# Patient Record
Sex: FEMALE | Race: WHITE | Employment: FULL TIME | URBAN - METROPOLITAN AREA
[De-identification: names, ages, dates, MRNs, and addresses within clinical notes are randomized per-mention and may not be internally consistent; named-entity substitution may affect disease eponyms.]

---

## 2017-08-21 ENCOUNTER — HOSPITAL ENCOUNTER (OUTPATIENT)
Dept: PHYSICAL THERAPY | Age: 37
Discharge: HOME | End: 2017-08-21
Payer: COMMERCIAL

## 2017-08-21 PROCEDURE — 97162 PT EVAL MOD COMPLEX 30 MIN: CPT

## 2017-08-21 PROCEDURE — 97110 THERAPEUTIC EXERCISES: CPT

## 2017-08-21 NOTE — PROGRESS NOTES
PELVIC FLOOR DAILY TREATMENT NOTE 8-14    Patient Name: Florina Lua  Date:2017  : 1980  [x]  Patient  Verified  Payor: [employer] / Plan: Kimani Warrentorie / Product Type: HMO /    In time:2:30  Out time:3:39  Total Treatment Time (min): 71    Visit #: 1 of 8    Treatment Area: Pelvic and perineal pain [R10.2]    SUBJECTIVE  Pain Level (0-10 scale): 3  Any medication changes, allergies to medications, adverse drug reactions, diagnosis change, or new procedure performed?: [x] No    [] Yes (see summary sheet for update)  Subjective functional status/changes:   [] No changes reported  See medical history    OBJECTIVE            23 min Patient Education: [x] Review HEP    [] Progressed/Changed HEP based on: Educated Pt in pelvic floor anatomy, function/dysfunction and correct execution of a pelvic floor contraction. Reviewed biofeedback results. Extensive education in rectocele including with mirror. Discussed with patient that it may take 4-6 weeks of pelvic floor strengthening to see whether toileting can improve. [] positioning   [] body mechanics   [] transfers   [] heat/ice application          Pain Level (0-10 scale) post treatment: 3    ASSESSMENT/Changes in Function: Justification for Eval Code Complexity:  Patient History : chronic, G3,P3   Examination see exam   Clinical Presentation: evolving  Clinical Decision Making : FOTO : 100 /100. PFDI prolapse      Patient will continue to benefit from skilled PT services to modify and progress therapeutic interventions, address functional mobility deficits, address strength deficits, analyze and address soft tissue restrictions, analyze and modify body mechanics/ergonomics, assess and modify postural abnormalities, instruct in home and community integration and address rectocele, UI and dyspareunia to attain remaining goals.      [x]  See Plan of Care  []  See progress note/recertification  []  See Discharge Summary         Progress towards goals / Updated goals:  Initial evaluation completed with home exercise program and education initiated.       PLAN  [x]  Upgrade activities as tolerated     []  Continue plan of care  []  Update interventions per flow sheet       []  Discharge due to:_  []  Other:_      Sherial Schirmer, PT 8/21/2017  2:28 PM      Future Appointments  Date Time Provider Zac Dunlap   8/21/2017 2:30 PM Sherial Schirmer, PT [facility]

## 2017-08-21 NOTE — PROGRESS NOTES
[address] THERAPY AT [facility], [address], [address] - Phone: [phone]  Fax: [phone] / [address] [facility]  Patient Name: Silvestre Cooper : 1980   Medical   Diagnosis: Pelvic and perineal pain [R10.2] Treatment Diagnosis: Pelvic and perineal pain [R10.2]   Onset Date:      Referral Source: Joanna Hobson MD [facility]): 2017   Prior Hospitalization: See medical history Provider #: [id number]   Prior Level of Function: Chronic symptoms   Comorbidities: G3, P3, Endometriosis   Medications: Verified on Patient Summary List   The Plan of Care and following information is based on the information from the initial evaluation.   ==================================================================================  Assessment / key information:  Patient is a 42 y.o.  female who presents to [facility] with diagnosis of Pelvic and perineal pain [R10.2]. Patient reports vaginal, sacral, lower abdominal and bilateral pelvic/groin pain, UI, dyspareunia and difficulty with bowel movements. She consistently has to apply pressure over the labia and strain to have a bowel movement. Patient is G3, P3 with 2 vaginal deliveries and 1  section. Symptoms began after  section in   Upon objective evaluation, patient demonstrates sacral and SI hypomobility. There was tenderness to palpation over levator ani and obturator internus muscles bilaterally. Strength of pelvic floor muscles was impaired at 2/5. On biofeedback patient presented with  low net rise of fast twitch contraction at 9.5 microvolts and low net rise of slow twitch contraction at 6.34 microvolts. There is a grade 2 rectocele present. Fabere special test was + bilaterally. Patient scored 100 on FOTO/PFDI prolapse indicating decreased quality of life.   Patient can benefit from PT including retraining of muscle control on biofeedback to decrease pain and incontinence, Increase SI mobility and pelvic floor muscle strength to improve pelvic support, ability to engage in sex, ease of bowel movements and quality of life. There was extensive education and discussion about her rectocele. Patient was advised that it may take up to 4-6 weeks to know whether pelvic floor strengthening can begin to improve ease of bowel movements.  ==================================================================================  Eval Complexity: History: HIGH Complexity :3+ comorbidities / personal factors will impact the outcome/ POC Exam:HIGH Complexity : 4+ Standardized tests and measures addressing body structure, function, activity limitation and / or participation in recreation  Presentation: MEDIUM Complexity : Evolving with changing characteristics  Clinical Decision Making:HIGH Complexity : FOTO score of 1- 25 Overall Complexity:MEDIUM  Problem List: Pelvic pain/dysfunction, Decreased pelvic floor mm awareness, Decreased pelvic floor mm strength and Other   Treatment Plan may include any combination of the following: Therapeutic exercise, Neuromuscular re-education, Manual therapy, Physical agent/modality and Patient education  Patient / Family readiness to learn indicated by: asking questions, trying to perform skills and interest  Persons(s) to be included in education: patient (P)  Barriers to Learning/Limitations: None  Measures taken:    Patient Goal   \"Repair issue, no pain\"   Patient self reported health status: good  Rehabilitation Potential: good  Short Term Goals: To be accomplished in 4 weeks:     1) Patient performing pelvic floor exercises TID. 2) Patient will report 20% improvement in ease of bowel movements. 3) Patient will score +2 on GROC regarding pain with ADLs    4) Increase net rise of slow twitch contraction to 8 microvolts to increase pelvic support. Long Term Goals:  To be accomplished in 8 weeks:   1) Patient independent in HEP. 2) Patient will score +4 on GROC regarding pain with ADLs. 3) Decrease score on FOTO/PFDI prolapse to 80 to indicate improved quality of life. 4) Increase net rise of slow twitch contraction to 10 microvolts to improve ease of bowel movements. Frequency / Duration:   Patient to be seen  1  times per week for 8  weeks:  Patient / Caregiver education and instruction: Pain Management, Exercises and Other toileting techniques  G-Codes (GP): ilsa  Therapist Signature: Shaun Bojorquez PT Date: 9/10/1657   Certification Period: na Time: 7:02 PM   ==================================================================================  I certify that the above Physical Therapy Services are being furnished while the patient is under my care. I agree with the treatment plan and certify that this therapy is necessary. Physician Signature:        Date:       Time:     Please sign and [facility] or you may fax the signed copy to [phone]. Thank you.

## 2017-08-28 RX ORDER — SODIUM CHLORIDE 0.9 % (FLUSH) 0.9 %
5-10 SYRINGE (ML) INJECTION EVERY 8 HOURS
Status: CANCELLED | OUTPATIENT
Start: 2017-08-28

## 2017-08-28 RX ORDER — SODIUM CHLORIDE 0.9 % (FLUSH) 0.9 %
5-10 SYRINGE (ML) INJECTION AS NEEDED
Status: CANCELLED | OUTPATIENT
Start: 2017-08-28

## 2017-08-28 RX ORDER — SODIUM CHLORIDE, SODIUM LACTATE, POTASSIUM CHLORIDE, CALCIUM CHLORIDE 600; 310; 30; 20 MG/100ML; MG/100ML; MG/100ML; MG/100ML
150 INJECTION, SOLUTION INTRAVENOUS CONTINUOUS
Status: CANCELLED | OUTPATIENT
Start: 2017-09-05 | End: 2017-09-06

## 2017-08-29 ENCOUNTER — HOSPITAL ENCOUNTER (OUTPATIENT)
Dept: PREADMISSION TESTING | Age: 37
Discharge: HOME | End: 2017-08-29
Payer: COMMERCIAL

## 2017-08-29 DIAGNOSIS — N81.6 RECTOCELE: ICD-10-CM

## 2017-08-29 DIAGNOSIS — R10.2 ADNEXAL TENDERNESS, RIGHT: ICD-10-CM

## 2017-08-29 LAB
ABO + RH BLD: NORMAL
BLOOD GROUP ANTIBODIES SERPL: NORMAL
ERYTHROCYTE [DISTWIDTH] IN BLOOD BY AUTOMATED COUNT: 13.2 % (ref 11.6–14.5)
HCG SERPL-ACNC: <1 MIU/ML (ref 0–10)
HCT VFR BLD AUTO: 40.9 % (ref 35–45)
HGB BLD-MCNC: 13.6 G/DL (ref 12–16)
MCH RBC QN AUTO: 29.6 PG (ref 24–34)
MCHC RBC AUTO-ENTMCNC: 33.3 G/DL (ref 31–37)
MCV RBC AUTO: 88.9 FL (ref 74–97)
PLATELET # BLD AUTO: 314 K/UL (ref 135–420)
PMV BLD AUTO: 10.2 FL (ref 9.2–11.8)
RBC # BLD AUTO: 4.6 M/UL (ref 4.2–5.3)
SPECIMEN EXP DATE BLD: NORMAL
WBC # BLD AUTO: 8.5 K/UL (ref 4.6–13.2)

## 2017-08-29 PROCEDURE — 85027 COMPLETE CBC AUTOMATED: CPT | Performed by: OBSTETRICS & GYNECOLOGY

## 2017-08-29 PROCEDURE — 86900 BLOOD TYPING SEROLOGIC ABO: CPT | Performed by: OBSTETRICS & GYNECOLOGY

## 2017-08-29 PROCEDURE — 84702 CHORIONIC GONADOTROPIN TEST: CPT | Performed by: OBSTETRICS & GYNECOLOGY

## 2017-08-29 PROCEDURE — 36415 COLL VENOUS BLD VENIPUNCTURE: CPT | Performed by: OBSTETRICS & GYNECOLOGY

## 2017-08-29 NOTE — PERIOP NOTES
PAT - SURGICAL PRE-ADMISSION INSTRUCTIONS    NAME:  Alanis Puckett                                                          TODAY'S DATE:  8/29/2017    SURGERY DATE:  9/5/2017                                  SURGERY ARRIVAL TIME:  0800    1. Do NOT eat or drink anything, including candy or gum, after MIDNIGHT on [id number] , unless you have specific instructions from your Surgeon or Anesthesia Provider to do so. 2. No smoking on the day of surgery. 3. No alcohol 24 hours prior to the day of surgery. 4. No recreational drugs for one week prior to the day of surgery. 5. Leave all valuables, including money/purse, at home. 6. Remove all jewelry, nail polish, makeup (including mascara); no lotions, powders, deodorant, or perfume/cologne/after shave. 7. Glasses/Contact lenses and Dentures may be worn to the hospital.  They will be removed prior to surgery. 8. Call your doctor if symptoms of a cold or illness develop within 24 ours prior to surgery. 9. AN ADULT MUST DRIVE YOU HOME AFTER OUTPATIENT SURGERY. 10. If you are having an OUTPATIENT procedure, please make arrangements for a responsible adult to be with you for 24 hours after your surgery. 11. If you are admitted to the hospital, you will be assigned to a bed after surgery is complete. Normally a family member will not be able to see you until you are in your assigned bed. 15. Family is encouraged to accompany you to the hospital.  We ask visitors in the treatment area to be limited to ONE person at a time to ensure patient privacy. EXCEPTIONS WILL BE MADE AS NEEDED. 15. Children under 12 are discouraged from entering the treatment area and need to be supervised by an adult when in the waiting room. Special Instructions:     Take these medications the morning of surgery with a sip of water:  BLOOD PRESSURE/ HEART MEDS, Complete bowel prep per MD instructions., STOP anticoagulants AT LEAST 1 WEEK PRIOR to your surgery or, follow other MD instructions:  NSAIDS, BLOOD THINNERS, ASPIRIN    Patient Prep:    use CHG solution    These surgical instructions were reviewed with PATIENT during the PAT VISIT . A printed copy of the instructions was provided to PATIENT. Directions: On the morning of surgery, please go to the [facility]. Enter the building from the [facility] entrance, 1st floor (next to the Emergency Room entrance). Take the elevator to the 2nd floor. Sign in at the Registration Desk.     If you have any questions and/or concerns, please do not hesitate to call:  (Prior to the day of surgery)  [facility] unit:  [phone]  (Day of surgery)  [facility] unit:  [phone]

## 2017-09-01 ENCOUNTER — ANESTHESIA EVENT (OUTPATIENT)
Dept: SURGERY | Age: 37
End: 2017-09-01
Payer: COMMERCIAL

## 2017-09-01 NOTE — H&P
[address]  PRE-OP H AND PS    Name:  Judy Romero  MR#:  [id number]  :  1980  Account #:  [de-identified]  Date of Adm:  2017      PLAN: Robotic-assisted total laparoscopic hysterectomy, removal of  uterus, cervix, tubes, probably leaving the ovaries behind. DIAGNOSES  1. Dysmenorrhea. 2. Pain with bowel movements, suspect endometriosis. 3. She also has a rectocele, which is going to be not cared for at this  time. Future repair or physical therapy is planned for that. PAST HISTORY: Three vaginal deliveries, 1  section. Otherwise, she does not have any high blood pressure, heart disease,  diabetes, medical or surgical problems. FAMILY HISTORY: Paternal grandmother with breast cancer,  otherwise negative. SOCIAL HISTORY: Denies smoking or heavy alcohol use. ALLERGIES: IODINE CAUSES ITCHING. REVIEW OF SYSTEMS: Normal other than the above. PHYSICAL EXAMINATION  GENERAL: Oriented x3. VITAL SIGNS: Weight 229, blood pressure 138/88. HEENT: Normal.  THYROID: Normal.  CHEST: Clear to auscultation and percussion. BREASTS: Without masses. ABDOMEN: Benign. PELVIC: Benign. Pap is repeated and up to date recently. Ultrasound  does not reveal any particular masses. IMPRESSION  1. Rectocele, not to be repaired at this time. 2. Severe dysmenorrhea, unremitting with observation. Declines birth  control cycling. 3. Suspected endometriosis. PLAN: Robotic-assisted total laparoscopic hysterectomy, removal of  uterus, cervix, tubes, but probably not the ovaries unless  endometriosis is severe.         Claudette Stanton MD    [address] [facility] /   D:  2017   10:20  T:  2017   10:49  Job #:  [id number]

## 2017-09-05 ENCOUNTER — HOSPITAL ENCOUNTER (OUTPATIENT)
Age: 37
Discharge: HOME | End: 2017-09-06
Attending: OBSTETRICS & GYNECOLOGY | Admitting: OBSTETRICS & GYNECOLOGY
Payer: COMMERCIAL

## 2017-09-05 ENCOUNTER — ANESTHESIA (OUTPATIENT)
Dept: SURGERY | Age: 37
End: 2017-09-05
Payer: COMMERCIAL

## 2017-09-05 PROBLEM — Z90.710 S/P HYSTERECTOMY: Status: ACTIVE | Noted: 2017-09-05

## 2017-09-05 LAB — HCG UR QL: NEGATIVE

## 2017-09-05 PROCEDURE — 77030022704 HC SUT VLOC COVD -B: Performed by: OBSTETRICS & GYNECOLOGY

## 2017-09-05 PROCEDURE — 74011250637 HC RX REV CODE- 250/637

## 2017-09-05 PROCEDURE — 76060000035 HC ANESTHESIA 2 TO 2.5 HR: Performed by: OBSTETRICS & GYNECOLOGY

## 2017-09-05 PROCEDURE — 77030029357 HC DEV CLSR FAC SYS EFX TELE -C: Performed by: OBSTETRICS & GYNECOLOGY

## 2017-09-05 PROCEDURE — C1727 CATH, BAL TIS DIS, NON-VAS: HCPCS | Performed by: OBSTETRICS & GYNECOLOGY

## 2017-09-05 PROCEDURE — 77030008771 HC TU NG SALEM SUMP -A: Performed by: NURSE ANESTHETIST, CERTIFIED REGISTERED

## 2017-09-05 PROCEDURE — 74011250636 HC RX REV CODE- 250/636

## 2017-09-05 PROCEDURE — 77030002933 HC SUT MCRYL J&J -A: Performed by: OBSTETRICS & GYNECOLOGY

## 2017-09-05 PROCEDURE — 77030032490 HC SLV COMPR SCD KNE COVD -B: Performed by: OBSTETRICS & GYNECOLOGY

## 2017-09-05 PROCEDURE — 77030003543 HC NDL EPDRL BBMI -A: Performed by: OBSTETRICS & GYNECOLOGY

## 2017-09-05 PROCEDURE — 77030008606 HC TRCR ENDOSC KII AMR -B: Performed by: OBSTETRICS & GYNECOLOGY

## 2017-09-05 PROCEDURE — 77030008518 HC TBNG INSUF ENDO STRY -B: Performed by: OBSTETRICS & GYNECOLOGY

## 2017-09-05 PROCEDURE — 74011250636 HC RX REV CODE- 250/636: Performed by: OBSTETRICS & GYNECOLOGY

## 2017-09-05 PROCEDURE — 74011250636 HC RX REV CODE- 250/636: Performed by: NURSE ANESTHETIST, CERTIFIED REGISTERED

## 2017-09-05 PROCEDURE — 74011000250 HC RX REV CODE- 250: Performed by: OBSTETRICS & GYNECOLOGY

## 2017-09-05 PROCEDURE — 77030034850: Performed by: OBSTETRICS & GYNECOLOGY

## 2017-09-05 PROCEDURE — 81025 URINE PREGNANCY TEST: CPT

## 2017-09-05 PROCEDURE — 77030018778 HC MANIP UTER VCAR CNMD -B: Performed by: OBSTETRICS & GYNECOLOGY

## 2017-09-05 PROCEDURE — 76210000006 HC OR PH I REC 0.5 TO 1 HR: Performed by: OBSTETRICS & GYNECOLOGY

## 2017-09-05 PROCEDURE — 88307 TISSUE EXAM BY PATHOLOGIST: CPT | Performed by: OBSTETRICS & GYNECOLOGY

## 2017-09-05 PROCEDURE — 77030008683 HC TU ET CUF COVD -A: Performed by: NURSE ANESTHETIST, CERTIFIED REGISTERED

## 2017-09-05 PROCEDURE — 77030013079 HC BLNKT BAIR HGGR 3M -A: Performed by: NURSE ANESTHETIST, CERTIFIED REGISTERED

## 2017-09-05 PROCEDURE — 77030020255 HC SOL INJ LR 1000ML BG

## 2017-09-05 PROCEDURE — 77030020703 HC SEAL CANN DISP INTU -B: Performed by: OBSTETRICS & GYNECOLOGY

## 2017-09-05 PROCEDURE — 77030010507 HC ADH SKN DERMBND J&J -B: Performed by: OBSTETRICS & GYNECOLOGY

## 2017-09-05 PROCEDURE — 77030035048 HC TRCR ENDOSC OPTCL COVD -B: Performed by: OBSTETRICS & GYNECOLOGY

## 2017-09-05 PROCEDURE — 77030018842 HC SOL IRR SOD CL 9% BAXT -A: Performed by: OBSTETRICS & GYNECOLOGY

## 2017-09-05 PROCEDURE — 51798 US URINE CAPACITY MEASURE: CPT

## 2017-09-05 PROCEDURE — 74011250637 HC RX REV CODE- 250/637: Performed by: OBSTETRICS & GYNECOLOGY

## 2017-09-05 PROCEDURE — 77030033067 HC SUT PDO STRATFX SPIR J&J -B: Performed by: OBSTETRICS & GYNECOLOGY

## 2017-09-05 PROCEDURE — 76010000876 HC OR TIME 2 TO 2.5HR INTENSV - TIER 2: Performed by: OBSTETRICS & GYNECOLOGY

## 2017-09-05 PROCEDURE — 77030020254 HC SOL INJ D5LR LACTATED RINGER

## 2017-09-05 PROCEDURE — 77030008477 HC STYL SATN SLP COVD -A: Performed by: NURSE ANESTHETIST, CERTIFIED REGISTERED

## 2017-09-05 PROCEDURE — 77030035277 HC OBTRTR BLDELSS DISP INTU -B: Performed by: OBSTETRICS & GYNECOLOGY

## 2017-09-05 PROCEDURE — 77030011640 HC PAD GRND REM COVD -A: Performed by: OBSTETRICS & GYNECOLOGY

## 2017-09-05 PROCEDURE — 77030002986 HC SUT PROL J&J -A: Performed by: OBSTETRICS & GYNECOLOGY

## 2017-09-05 PROCEDURE — 77030016151 HC PROTCTR LNS DFOG COVD -B: Performed by: OBSTETRICS & GYNECOLOGY

## 2017-09-05 PROCEDURE — 74011000250 HC RX REV CODE- 250

## 2017-09-05 RX ORDER — SODIUM CHLORIDE 0.9 % (FLUSH) 0.9 %
5-10 SYRINGE (ML) INJECTION EVERY 8 HOURS
Status: DISCONTINUED | OUTPATIENT
Start: 2017-09-05 | End: 2017-09-06 | Stop reason: HOSPADM

## 2017-09-05 RX ORDER — VECURONIUM BROMIDE FOR INJECTION 1 MG/ML
INJECTION, POWDER, LYOPHILIZED, FOR SOLUTION INTRAVENOUS AS NEEDED
Status: DISCONTINUED | OUTPATIENT
Start: 2017-09-05 | End: 2017-09-05 | Stop reason: HOSPADM

## 2017-09-05 RX ORDER — SODIUM CHLORIDE, SODIUM LACTATE, POTASSIUM CHLORIDE, CALCIUM CHLORIDE 600; 310; 30; 20 MG/100ML; MG/100ML; MG/100ML; MG/100ML
50 INJECTION, SOLUTION INTRAVENOUS CONTINUOUS
Status: DISCONTINUED | OUTPATIENT
Start: 2017-09-06 | End: 2017-09-05 | Stop reason: HOSPADM

## 2017-09-05 RX ORDER — ONDANSETRON 4 MG/1
4 TABLET, ORALLY DISINTEGRATING ORAL
Status: DISCONTINUED | OUTPATIENT
Start: 2017-09-05 | End: 2017-09-06 | Stop reason: HOSPADM

## 2017-09-05 RX ORDER — KETOROLAC TROMETHAMINE 30 MG/ML
INJECTION, SOLUTION INTRAMUSCULAR; INTRAVENOUS AS NEEDED
Status: DISCONTINUED | OUTPATIENT
Start: 2017-09-05 | End: 2017-09-05 | Stop reason: HOSPADM

## 2017-09-05 RX ORDER — BUPIVACAINE HYDROCHLORIDE AND EPINEPHRINE 5; 5 MG/ML; UG/ML
INJECTION, SOLUTION EPIDURAL; INTRACAUDAL; PERINEURAL AS NEEDED
Status: DISCONTINUED | OUTPATIENT
Start: 2017-09-05 | End: 2017-09-05 | Stop reason: HOSPADM

## 2017-09-05 RX ORDER — DIPHENHYDRAMINE HCL 25 MG
25 CAPSULE ORAL
Status: DISCONTINUED | OUTPATIENT
Start: 2017-09-05 | End: 2017-09-06 | Stop reason: HOSPADM

## 2017-09-05 RX ORDER — ACETAMINOPHEN 325 MG/1
650 TABLET ORAL
Status: DISCONTINUED | OUTPATIENT
Start: 2017-09-05 | End: 2017-09-06 | Stop reason: HOSPADM

## 2017-09-05 RX ORDER — ONDANSETRON 2 MG/ML
INJECTION INTRAMUSCULAR; INTRAVENOUS AS NEEDED
Status: DISCONTINUED | OUTPATIENT
Start: 2017-09-05 | End: 2017-09-05 | Stop reason: HOSPADM

## 2017-09-05 RX ORDER — PROPOFOL 10 MG/ML
INJECTION, EMULSION INTRAVENOUS AS NEEDED
Status: DISCONTINUED | OUTPATIENT
Start: 2017-09-05 | End: 2017-09-05 | Stop reason: HOSPADM

## 2017-09-05 RX ORDER — DOCUSATE SODIUM 100 MG/1
100 CAPSULE, LIQUID FILLED ORAL 2 TIMES DAILY
Status: DISCONTINUED | OUTPATIENT
Start: 2017-09-05 | End: 2017-09-06 | Stop reason: HOSPADM

## 2017-09-05 RX ORDER — FENTANYL CITRATE 50 UG/ML
50 INJECTION, SOLUTION INTRAMUSCULAR; INTRAVENOUS AS NEEDED
Status: DISCONTINUED | OUTPATIENT
Start: 2017-09-05 | End: 2017-09-05 | Stop reason: HOSPADM

## 2017-09-05 RX ORDER — NEOSTIGMINE METHYLSULFATE 5 MG/5 ML
SYRINGE (ML) INTRAVENOUS AS NEEDED
Status: DISCONTINUED | OUTPATIENT
Start: 2017-09-05 | End: 2017-09-05 | Stop reason: HOSPADM

## 2017-09-05 RX ORDER — SODIUM CHLORIDE, SODIUM LACTATE, POTASSIUM CHLORIDE, CALCIUM CHLORIDE 600; 310; 30; 20 MG/100ML; MG/100ML; MG/100ML; MG/100ML
50 INJECTION, SOLUTION INTRAVENOUS CONTINUOUS
Status: DISCONTINUED | OUTPATIENT
Start: 2017-09-05 | End: 2017-09-05 | Stop reason: HOSPADM

## 2017-09-05 RX ORDER — GLYCOPYRROLATE 0.2 MG/ML
INJECTION INTRAMUSCULAR; INTRAVENOUS AS NEEDED
Status: DISCONTINUED | OUTPATIENT
Start: 2017-09-05 | End: 2017-09-05 | Stop reason: HOSPADM

## 2017-09-05 RX ORDER — FENTANYL CITRATE 50 UG/ML
INJECTION, SOLUTION INTRAMUSCULAR; INTRAVENOUS AS NEEDED
Status: DISCONTINUED | OUTPATIENT
Start: 2017-09-05 | End: 2017-09-05 | Stop reason: HOSPADM

## 2017-09-05 RX ORDER — HYDROMORPHONE HYDROCHLORIDE 2 MG/ML
0.5 INJECTION, SOLUTION INTRAMUSCULAR; INTRAVENOUS; SUBCUTANEOUS
Status: DISCONTINUED | OUTPATIENT
Start: 2017-09-05 | End: 2017-09-05 | Stop reason: HOSPADM

## 2017-09-05 RX ORDER — MIDAZOLAM HYDROCHLORIDE 1 MG/ML
INJECTION, SOLUTION INTRAMUSCULAR; INTRAVENOUS AS NEEDED
Status: DISCONTINUED | OUTPATIENT
Start: 2017-09-05 | End: 2017-09-05 | Stop reason: HOSPADM

## 2017-09-05 RX ORDER — DEXAMETHASONE SODIUM PHOSPHATE 4 MG/ML
INJECTION, SOLUTION INTRA-ARTICULAR; INTRALESIONAL; INTRAMUSCULAR; INTRAVENOUS; SOFT TISSUE AS NEEDED
Status: DISCONTINUED | OUTPATIENT
Start: 2017-09-05 | End: 2017-09-05 | Stop reason: HOSPADM

## 2017-09-05 RX ORDER — HYDROMORPHONE HYDROCHLORIDE 2 MG/ML
INJECTION, SOLUTION INTRAMUSCULAR; INTRAVENOUS; SUBCUTANEOUS AS NEEDED
Status: DISCONTINUED | OUTPATIENT
Start: 2017-09-05 | End: 2017-09-05 | Stop reason: HOSPADM

## 2017-09-05 RX ORDER — SODIUM CHLORIDE 0.9 % (FLUSH) 0.9 %
5-10 SYRINGE (ML) INJECTION AS NEEDED
Status: DISCONTINUED | OUTPATIENT
Start: 2017-09-05 | End: 2017-09-06 | Stop reason: HOSPADM

## 2017-09-05 RX ORDER — FAMOTIDINE 20 MG/1
20 TABLET, FILM COATED ORAL ONCE
Status: DISCONTINUED | OUTPATIENT
Start: 2017-09-06 | End: 2017-09-05 | Stop reason: HOSPADM

## 2017-09-05 RX ORDER — SODIUM CHLORIDE 0.9 % (FLUSH) 0.9 %
5-10 SYRINGE (ML) INJECTION AS NEEDED
Status: DISCONTINUED | OUTPATIENT
Start: 2017-09-05 | End: 2017-09-05 | Stop reason: HOSPADM

## 2017-09-05 RX ORDER — CEFAZOLIN SODIUM 2 G/50ML
2 SOLUTION INTRAVENOUS
Status: COMPLETED | OUTPATIENT
Start: 2017-09-05 | End: 2017-09-05

## 2017-09-05 RX ORDER — HYDROMORPHONE HYDROCHLORIDE 1 MG/ML
1 INJECTION, SOLUTION INTRAMUSCULAR; INTRAVENOUS; SUBCUTANEOUS
Status: DISCONTINUED | OUTPATIENT
Start: 2017-09-05 | End: 2017-09-06 | Stop reason: HOSPADM

## 2017-09-05 RX ORDER — SUCCINYLCHOLINE CHLORIDE 20 MG/ML
INJECTION INTRAMUSCULAR; INTRAVENOUS AS NEEDED
Status: DISCONTINUED | OUTPATIENT
Start: 2017-09-05 | End: 2017-09-05 | Stop reason: HOSPADM

## 2017-09-05 RX ORDER — OXYCODONE AND ACETAMINOPHEN 5; 325 MG/1; MG/1
1 TABLET ORAL
Status: DISCONTINUED | OUTPATIENT
Start: 2017-09-05 | End: 2017-09-06 | Stop reason: HOSPADM

## 2017-09-05 RX ORDER — SODIUM CHLORIDE 0.9 % (FLUSH) 0.9 %
5-10 SYRINGE (ML) INJECTION EVERY 8 HOURS
Status: DISCONTINUED | OUTPATIENT
Start: 2017-09-05 | End: 2017-09-05 | Stop reason: HOSPADM

## 2017-09-05 RX ORDER — LIDOCAINE HYDROCHLORIDE 20 MG/ML
INJECTION, SOLUTION EPIDURAL; INFILTRATION; INTRACAUDAL; PERINEURAL AS NEEDED
Status: DISCONTINUED | OUTPATIENT
Start: 2017-09-05 | End: 2017-09-05 | Stop reason: HOSPADM

## 2017-09-05 RX ORDER — FAMOTIDINE 20 MG/1
TABLET, FILM COATED ORAL
Status: COMPLETED
Start: 2017-09-05 | End: 2017-09-05

## 2017-09-05 RX ADMIN — VECURONIUM BROMIDE FOR INJECTION 2 MG: 1 INJECTION, POWDER, LYOPHILIZED, FOR SOLUTION INTRAVENOUS at 11:33

## 2017-09-05 RX ADMIN — GLYCOPYRROLATE 0.4 MG: 0.2 INJECTION INTRAMUSCULAR; INTRAVENOUS at 12:15

## 2017-09-05 RX ADMIN — ONDANSETRON 4 MG: 2 INJECTION INTRAMUSCULAR; INTRAVENOUS at 10:17

## 2017-09-05 RX ADMIN — FAMOTIDINE 20 MG: 20 TABLET ORAL at 09:09

## 2017-09-05 RX ADMIN — Medication 3 MG: at 12:15

## 2017-09-05 RX ADMIN — CEFAZOLIN SODIUM 2 G: 2 SOLUTION INTRAVENOUS at 10:31

## 2017-09-05 RX ADMIN — LIDOCAINE HYDROCHLORIDE 40 MG: 20 INJECTION, SOLUTION EPIDURAL; INFILTRATION; INTRACAUDAL; PERINEURAL at 10:19

## 2017-09-05 RX ADMIN — VECURONIUM BROMIDE FOR INJECTION 5 MG: 1 INJECTION, POWDER, LYOPHILIZED, FOR SOLUTION INTRAVENOUS at 10:26

## 2017-09-05 RX ADMIN — PROPOFOL 180 MG: 10 INJECTION, EMULSION INTRAVENOUS at 10:19

## 2017-09-05 RX ADMIN — KETOROLAC TROMETHAMINE 30 MG: 30 INJECTION, SOLUTION INTRAMUSCULAR; INTRAVENOUS at 12:17

## 2017-09-05 RX ADMIN — VECURONIUM BROMIDE FOR INJECTION 2 MG: 1 INJECTION, POWDER, LYOPHILIZED, FOR SOLUTION INTRAVENOUS at 10:58

## 2017-09-05 RX ADMIN — SODIUM CHLORIDE, SODIUM LACTATE, POTASSIUM CHLORIDE, AND CALCIUM CHLORIDE 50 ML/HR: 600; 310; 30; 20 INJECTION, SOLUTION INTRAVENOUS at 09:03

## 2017-09-05 RX ADMIN — HYDROMORPHONE HYDROCHLORIDE 1 MG: 1 INJECTION, SOLUTION INTRAMUSCULAR; INTRAVENOUS; SUBCUTANEOUS at 22:59

## 2017-09-05 RX ADMIN — SUCCINYLCHOLINE CHLORIDE 100 MG: 20 INJECTION INTRAMUSCULAR; INTRAVENOUS at 10:19

## 2017-09-05 RX ADMIN — OXYCODONE HYDROCHLORIDE AND ACETAMINOPHEN 1 TABLET: 5; 325 TABLET ORAL at 14:06

## 2017-09-05 RX ADMIN — OXYCODONE HYDROCHLORIDE AND ACETAMINOPHEN 1 TABLET: 5; 325 TABLET ORAL at 19:08

## 2017-09-05 RX ADMIN — HYDROMORPHONE HYDROCHLORIDE 1 MG: 2 INJECTION, SOLUTION INTRAMUSCULAR; INTRAVENOUS; SUBCUTANEOUS at 10:12

## 2017-09-05 RX ADMIN — HYDROMORPHONE HYDROCHLORIDE 0.5 MG: 2 INJECTION INTRAMUSCULAR; INTRAVENOUS; SUBCUTANEOUS at 13:33

## 2017-09-05 RX ADMIN — MIDAZOLAM HYDROCHLORIDE 2 MG: 1 INJECTION, SOLUTION INTRAMUSCULAR; INTRAVENOUS at 10:09

## 2017-09-05 RX ADMIN — DEXAMETHASONE SODIUM PHOSPHATE 4 MG: 4 INJECTION, SOLUTION INTRA-ARTICULAR; INTRALESIONAL; INTRAMUSCULAR; INTRAVENOUS; SOFT TISSUE at 10:17

## 2017-09-05 RX ADMIN — HYDROMORPHONE HYDROCHLORIDE 1 MG: 2 INJECTION, SOLUTION INTRAMUSCULAR; INTRAVENOUS; SUBCUTANEOUS at 10:18

## 2017-09-05 RX ADMIN — DOCUSATE SODIUM 100 MG: 100 CAPSULE, LIQUID FILLED ORAL at 19:08

## 2017-09-05 RX ADMIN — FENTANYL CITRATE 100 MCG: 50 INJECTION, SOLUTION INTRAMUSCULAR; INTRAVENOUS at 12:40

## 2017-09-05 RX ADMIN — SODIUM CHLORIDE, SODIUM LACTATE, POTASSIUM CHLORIDE, AND CALCIUM CHLORIDE: 600; 310; 30; 20 INJECTION, SOLUTION INTRAVENOUS at 12:24

## 2017-09-05 RX ADMIN — ONDANSETRON 4 MG: 4 TABLET, ORALLY DISINTEGRATING ORAL at 16:42

## 2017-09-05 NOTE — PROGRESS NOTES
1553 - received pt in room. Pt is up to bathroom. Void x1. Family at bedside. 1612 - assessment completed. AOx4. VSS. Pain rated 7/10, pt refuse additional pain medication at this time. Wishes to wait for scheduled time. 1800 - pt resting in bed, bladder scan performed for 259cc. Pt c/o fullness. Pt will make another attempt. 1850 - pt transferred to another floor, ambulated to room. No distress noted. Upon arrival to room pt attempted another void, able to void scant amount. Pt advised that she does have difficultly and needs to position herself at times. Will continue to monitor.

## 2017-09-05 NOTE — IP AVS SNAPSHOT
[address] [address] [facility] Patient: Suzy Solomon MRN: [id number] :1980 You are allergic to the following No active allergies Recent Documentation Height Weight BMI OB Status Smoking Status 1.727 m 104.3 kg 34.97 kg/m2 Having regular periods Never Smoker Emergency Contacts Name Discharge Info Relation Home Work Mobile Tavares Martin DISCHARGE CAREGIVER [3] Spouse [3] [phone] About your hospitalization You were admitted on:  2017 You last received care in the:  [facility] 2E GEN SURG You were discharged on:  2017 Unit phone number:  [phone] Why you were hospitalized Your primary diagnosis was:  Not on File Your diagnoses also included:  S/P Hysterectomy Providers Seen During Your Hospitalizations Provider Role Specialty Primary office phone Ana María Steel MD Attending Provider Obstetrics & Gynecology [phone] Your Primary Care Physician (PCP) Primary Care Physician Office Phone Office Fax NONE ** None ** ** None ** Follow-up Information Follow up With Details Comments Contact Info None   None ([phone]) Patient stated that they have no PCP 
  
 Ana María Steel MD In 2 weeks for follow up [address] [place] [address] [address] [id number] [phone] Current Discharge Medication List  
  
Notice You have not been prescribed any medications. Discharge Instructions DISCHARGE SUMMARY from Nurse The following personal items are in your possession at time of discharge: 
 
Dental Appliances: None Visual Aid: None PATIENT INSTRUCTIONS: 
 
 
F-face looks uneven A-arms unable to move or move unevenly S-speech slurred or non-existent T-time-call 911 as soon as signs and symptoms begin-DO NOT go Back to bed or wait to see if you get better-TIME IS BRAIN. Warning Signs of HEART ATTACK Call 911 if you have these symptoms: 
? Chest discomfort. Most heart attacks involve discomfort in the center of the chest that lasts more than a few minutes, or that goes away and comes back. It can feel like uncomfortable pressure, squeezing, fullness, or pain. ? Discomfort in other areas of the upper body. Symptoms can include pain or discomfort in one or both arms, the back, neck, jaw, or stomach. ? Shortness of breath with or without chest discomfort. ? Other signs may include breaking out in a cold sweat, nausea, or lightheadedness. Don't wait more than five minutes to call [address]! Fast action can save your life. Calling 911 is almost always the fastest way to get lifesaving treatment. Emergency Medical Services staff can begin treatment when they arrive  up to an hour sooner than if someone gets to the hospital by car. The discharge information has been reviewed with the patient. The patient verbalized understanding. Discharge medications reviewed with the patient and appropriate educational materials and side effects teaching were provided. Patient armband removed and shredded. Patient Instructions: 
- No heavy lifting for 4 weeks (>20lbs), pelvic rest for 6 weeks (no intercourse, tampons, douching). - No driving for 1 week or while taking Rx pain medication.  
- Begin taking a stool softener (Colace). May take a laxative (Miralax) for constipation. - Contact office for fevers >101F, pain not controlled with oral pain medicine, or heavy vaginal bleeding (soaking 1 pad every 1-2 hours). May expect to see some light vaginal spotting/discharge (brown - red).  
  
Diet: Regular Diet 
  
Wound Care: OK to shower, no tub baths. Skin glue and stitches will dissolve on their own, do not remove.  
  
Follow-up with 2 weeks as previously scheduled. . 
  
  
 
Discharge Instructions Attachments/References LAPAROSCOPIC HYSTERECTOMY: POST-OP (ENGLISH) Discharge Orders Procedure Order Date Status Priority Quantity Spec Type Associated Dx CALL YOUR DOCTOR For: Temperature greater than 100.4., Persistant nausea and vomiting., Severe uncontrolled pain. , Redness, tenderness, or signs of infection. 09/06/17 0605 Normal Routine 1 Questions: For:  Temperature greater than 100.4. For:  Persistant nausea and vomiting. For:  Severe uncontrolled pain. For:  Redness, tenderness, or signs of infection. ACTIVITY AFTER DISCHARGE Patient should: Restrict sexual activity. 09/06/17 0605 Normal Routine 1 Schedule Instructions:  Nothing in the vagina for 6 weeks. No lifting greater than 10 lbs for 1 week Questions: Patient should:  Restrict sexual activity. DIET REGULAR No added salt 09/06/17 0605 Normal Routine 1 Questions: Additional options:  No added salt [facility] [place] Announcement We are excited to announce that we are making your provider's discharge notes available to you in MongoDB. You will see these notes when they are completed and signed by the physician that discharged you from your recent hospital stay. If you have any questions or concerns about any information you see in Fromographyt, please call the Health Information Department where you were seen or reach out to your Primary Care Provider for more information about your plan of care. Introducing [facility] SERVICES! [facility] introduces GEOCOMtms patient portal. Now you can access parts of your medical record, email your doctor's office, and request medication refills online. 1. In your internet browser, go to [email or web address] 2. Click on the First Time User? Click Here link in the Sign In box. You will see the New Member Sign Up page. 3. Enter your GEOCOMtms Access Code exactly as it appears below. You will not need to use this code after youve completed the sign-up process. If you do not sign up before the expiration date, you must request a new code. · GEOCOMtms Access Code: [email or web address] Expires: 12/5/2017  8:56 AM 
 
4. Enter the last four digits of your Social Security Number (xxxx) and Date of Birth (mm/dd/yyyy) as indicated and click Submit. You will be taken to the next sign-up page. 5. Create a GEOCOMtms ID. This will be your GEOCOMtms login ID and cannot be changed, so think of one that is secure and easy to remember. 6. Create a GEOCOMtms password. You can change your password at any time. 7. Enter your Password Reset Question and Answer. This can be used at a later time if you forget your password. 8. Enter your e-mail address. You will receive e-mail notification when new information is available in [address] 9. Click Sign Up. You can now view and download portions of your medical record. 10. Click the Download Summary menu link to download a portable copy of your medical information. If you have questions, please visit the Frequently Asked Questions section of the GEOCOMtms website. Remember, GEOCOMtms is NOT to be used for urgent needs. For medical emergencies, dial 911. Now available from your iPhone and Android! General Information Please provide this summary of care documentation to your next provider. Patient Signature:  ____________________________________________________________ Date:  ____________________________________________________________  
  
Vianey Velázquez Provider Signature:  ____________________________________________________________ Date:  ____________________________________________________________ More Information Laparoscopic Hysterectomy: What to Expect at [facility] Your Recovery A hysterectomy is surgery to take out the uterus. In some cases, the ovaries and fallopian tubes also are taken out at the same time. You can expect to feel better and stronger each day, but you may need pain medicine for a week or two. You may get tired easily or have less energy than usual. The tiredness may last for several weeks after surgery. You will probably notice that your belly is swollen and puffy. This is common. The swelling will take several weeks to go down. You may take about 4 to 6 weeks to fully recover. It is important to avoid lifting while you are recovering so that you can heal. 
This care sheet gives you a general idea about how long it will take for you to recover. But each person recovers at a different pace. Follow the steps below to get better as quickly as possible. How can you care for yourself at home? Activity · Rest when you feel tired. · Be active. Walking is a good choice. · Allow the area to heal. Don't move quickly or lift anything heavy until you are feeling better. · You may shower 24 to 48 hours after surgery, if your doctor okays it. Pat the incision dry. Do not take a bath for the first 2 weeks, or until your doctor tells you it is okay. · Ask your doctor when it is okay for you to have sex. Diet · You can eat your normal diet. If your stomach is upset, try bland, low-fat foods like plain rice, broiled chicken, toast, and yogurt. · If your bowel movements are not regular right after surgery, try to avoid constipation and straining. Drink plenty of water. Your doctor may suggest fiber, a stool softener, or a mild laxative. Medicines · Your doctor will tell you if and when you can restart your medicines. He or she will also give you instructions about taking any new medicines. · If you take blood thinners, such as warfarin (Coumadin), clopidogrel (Plavix), or aspirin, be sure to talk to your doctor. He or she will tell you if and when to start taking those medicines again. Make sure that you understand exactly what your doctor wants you to do. · Be safe with medicines. Read and follow all instructions on the label. ¨ If the doctor gave you a prescription medicine for pain, take it as prescribed. ¨ If you are not taking a prescription pain medicine, ask your doctor if you can take an over-the-counter medicine. · If your doctor prescribed antibiotics, take them as directed. Do not stop taking them just because you feel better. You need to take the full course of antibiotics. Incision care · You may have stitches over the cuts (incisions) the doctor made in your belly. · If you have strips of tape on the cut (incision) the doctor made, leave the tape on for a week or until it falls off. · Wash the area daily with warm, soapy water, and pat it dry. Don't use hydrogen peroxide or alcohol. They can slow healing. · You may cover the area with a gauze bandage if it oozes fluid or rubs against clothing. · Change the bandage every day. Other instructions · You may have some light vaginal bleeding. Wear sanitary pads if needed. Do not douche or use tampons. · Don't have sex until the doctor says it is okay. Follow-up care is a key part of your treatment and safety. Be sure to make and go to all appointments, and call your doctor if you are having problems. It's also a good idea to know your test results and keep a list of the medicines you take. When should you call for help? Call 911 anytime you think you may need emergency care. For example, call if: 
· You passed out (lost consciousness). · You have sudden chest pain and shortness of breath, or you cough up blood. Call your doctor now or seek immediate medical care if: 
· You have severe vaginal bleeding. This means that you are soaking through your usual pads every hour for 2 or more hours. · You have sudden, severe pain in your belly or pelvis. · You have loose stitches, or your incision comes open. · You have signs of infection, such as: 
¨ Increased pain, swelling, warmth, or redness. ¨ Red streaks leading from the incision. ¨ Pus draining from the incision. ¨ Swollen lymph nodes in your neck, armpits, or groin. ¨ A fever. Watch closely for changes in your health, and be sure to contact your doctor if: 
· You do not get better as expected. Where can you learn more? Go to http://monae-torito.info/. Enter Q131 in the search box to learn more about \"Laparoscopic Hysterectomy: What to Expect at Home. \" Current as of: October 13, 2016 Content Version: 11.3 © 5871-0558 [employer]. Care instructions adapted under license by [employer] (which disclaims liability or warranty for this information). If you have questions about a medical condition or this instruction, always ask your healthcare professional. [address] any warranty or liability for your use of this information.

## 2017-09-05 NOTE — PERIOP NOTES
Patient arrived to PACU via stretcher, attached to monitor, VS stable, orders acknowledged, will continue to monitor. 1510: TRANSFER - OUT REPORT:    Verbal report given to LETY Ramirez(name) on Lisa Maya  being transferred to [facility](unit) for routine post - op       Report consisted of patients Situation, Background, Assessment and   Recommendations(SBAR). Information from the following report(s) SBAR, Kardex, OR Summary, Intake/Output, MAR and Cardiac Rhythm Normal Sinus Rhythm was reviewed with the receiving nurse. Lines:   Peripheral IV 09/05/17 Left Arm (Active)   Site Assessment Clean, dry, & intact 9/5/2017  1:07 PM   Phlebitis Assessment 0 9/5/2017  1:07 PM   Infiltration Assessment 0 9/5/2017  1:07 PM   Dressing Status Clean, dry, & intact 9/5/2017  1:07 PM   Dressing Type Tape;Transparent 9/5/2017  1:07 PM   Hub Color/Line Status Pink; Infusing 9/5/2017  1:07 PM   Action Taken Open ports on tubing capped 9/5/2017 12:37 PM   Alcohol Cap Used Yes 9/5/2017 12:37 PM        Opportunity for questions and clarification was provided.       Patient transported with:   [employer]

## 2017-09-05 NOTE — PROGRESS NOTES
conducted an initial consultation and Spiritual Assessment for Max Minor, who is a 42 y.o.,female. Patients Primary Language is: [place]. According to the patients EMR Gnosticism Affiliation is: Hindu. The reason the Patient came to the hospital is:   Patient Active Problem List    Diagnosis Date Noted    S/P hysterectomy 09/05/2017        The  provided the following Interventions:  Initiated a relationship of care and support. Explored issues of sly, spirituality and/or Moravian needs while hospitalized. Listened empathically. Provided chaplaincy education. Provided information about Spiritual Care Services. Offered prayer and assurance of continued prayers on patient's behalf. Chart reviewed. The following outcomes were achieved:  Patient shared some information about their medical narrative and spiritual journey/beliefs. Patient processed feeling about current hospitalization. Patient expressed gratitude for the 's visit. Assessment:  Patient did not indicate any spiritual or Moravian issues which require Spiritual Care Services interventions at this time. Patient does not have any Moravian/cultural needs that will affect patients preferences in health care. Plan:  Chaplains will continue to follow and will provide pastoral care on an as needed or requested basis.  recommends bedside caregivers page  on duty if patient shows signs of acute spiritual or emotional distress.     [address] [facility]   Staff [address] [facility]   [phone]

## 2017-09-05 NOTE — PROGRESS NOTES
TRANSFER - IN REPORT:    Verbal report received from Dacia angelica Sinclair  being received from PACU for routine post - op      Report consisted of patients Situation, Background, Assessment and   Recommendations(SBAR). Information from the following report(s) SBAR and Kardex was reviewed with the receiving nurse. Opportunity for questions and clarification was provided. Assessment completed upon patients arrival to unit and care assumed. Received pt via bed awake and alert. Lap sites dry to abdomen. Oriented to call bell, phone and IS with pt giving return demonstration. Assisted OOB to BR by CNA to void.

## 2017-09-05 NOTE — PROGRESS NOTES
a  GYN Progress note    Patient: Roque Mena MRN: [id number]  SSN: [id number]    YOB: 1980  Age: 42 y.o. Sex: female      POD: Day of Surgery        Admitted for r10.2 pelvic perineal pain   n81.6 rectocele  S/P hysterectomy  Chief complaint: No chief complaint on file. Procedures: Procedure(s):  davinci total laparoscopic HYSTERECTOMY,  XI ROBOTIC ASSISTED   Active Problems:    S/P hysterectomy (2017)         Patient Active Problem List    Diagnosis Date Noted    S/P hysterectomy 2017   . Historical Additional  Problem List.:   Problem List as of 2017  Date Reviewed: 2017          Codes Class Noted - Resolved    S/P hysterectomy ICD-10-CM: Z90.710  ICD-9-CM: V88.01  2017 - Present               Patient is feeling well. Good pain control. Ambulating, voiding and eating well, no nausea or vomiting. Blood pressure 139/83, pulse 86, temperature 97.5 °F (36.4 °C), resp. rate 15, height 5' 8\" (1.727 m), weight 104.3 kg (230 lb), SpO2 95 %. Temp (24hrs), Av.9 °F (36.6 °C), Min:97.5 °F (36.4 °C), Max:98.2 °F (36.8 °C)      WBC   Date/Time Value Ref Range Status   2017 11:13 AM 8.5 4.6 - 13.2 K/uL Final   04/10/2010 05:30 AM 7.9 4.5 - 13.0 K/uL Final   2010 05:30 AM 9.7 4.5 - 13.0 K/uL Final     HGB   Date/Time Value Ref Range Status   2017 11:13 AM 13.6 12.0 - 16.0 g/dL Final   04/10/2010 05:30 AM 8.9 (L) 12.0 - 16.0 g/dL Final   2010 05:30 AM 10.5 (L) 12.0 - 16.0 g/dL Final     PLATELET   Date/Time Value Ref Range Status   2017 11:13  135 - 420 K/uL Final       Per my exam and/or the nursing assessment:  Chest is clear to auscultation. Abdomen soft not tender.   Bowel sounds are normal  Incisions are clean  Legs are normal without tenderness, swelling, or redness    Impression,  Doing well    Plan: prob DC in am    Justa Salinas MD

## 2017-09-05 NOTE — ANESTHESIA POSTPROCEDURE EVALUATION
Post-Anesthesia Evaluation and Assessment    Patient: Naz Husbands MRN: [id number]  SSN: [id number]    YOB: 1980  Age: 42 y.o. Sex: female      Data from PACU flowsheet    Cardiovascular Function/Vital Signs  Visit Vitals    /73 (BP 1 Location: Right arm, BP Patient Position: At rest;Supine)    Pulse 77    Temp 36.4 °C (97.5 °F)    Resp 12    Ht 5' 8\" (1.727 m)    Wt 104.3 kg (230 lb)    SpO2 93%    BMI 34.97 kg/m2       Patient is status post general anesthesia for Procedure(s):  davinci total laparoscopic HYSTERECTOMY,  XI ROBOTIC ASSISTED. Nausea/Vomiting: controlled    Postoperative hydration reviewed and adequate. Pain:  Pain Scale 1: Visual (09/05/17 1307)  Pain Intensity 1: 0 (09/05/17 1307)   Managed      Mental Status and Level of Consciousness: Alert and oriented     Pulmonary Status:   O2 Device: Room air (09/05/17 1307)   Adequate oxygenation and airway patent    Complications related to anesthesia: None    Post-anesthesia assessment completed.  No concerns    Signed By: Myesha Guerra MD     September 5, 2017

## 2017-09-05 NOTE — PROCEDURES
Javid Puente MD  [address] [facility]  [address] [facility]  [phone]    Operative Note for Robotic Assisted Hysterectomy    Name: Ben Patrick   Medical Record Number: [id number]   YOB: 1980  Today's Date: September 5, 2017    Preop Diagnosis: r10.2 pelvic perineal pain   n81.6 rectocele  Postop Diagnosis:same  Procedure(s):  davinci total laparoscopic HYSTERECTOMY,  XI ROBOTIC ASSISTED    Surgeon: MD Karly Nguyen MD , PGY4    Assistant: Circ-1: Luis Miguel Sood RN  Circ-Relief: Stacey Girard RN  Scrub Tech-1: Emiliano Quintanilla  Scrub Tech-Relief: Francisco Post  Surg Asst-1: Eveline Lopez     Anesthesia: General  EBL:50  Findings: 6 wk size uterus, normal tubes, right ovarian with small 1cm cyst.  Specimens:   ID Type Source Tests Collected by Time Destination   1 : Uterus, Cervix, Bilateral Fallopian tubes Preservative   Iker Munson MD 9/5/2017 1147 Pathology      Complications: none  Disposition: to the recovery room in stable condition    Procedure: The patient was brought to the operating room where patient identification and surgery identification were completed with the patient and the OR team. The patient was transferred to the OR table and GETA was obtained without difficulty. Both arms were padded and tucked. SCD's were on and activated. The patient was placed in the lithotomy position in adjustable Gurvinder stirrups. The was carefully tested in trendelenburg before being prepped and draped in the normal sterile fashion. Patient identification and surgery identification were repeated with the surgeon and the OR team.         An open sided speculum was placed vaginally and the anterior lip of the cervix was grasped with a single toothed tenaculum. The uterus was sounded to 7cm. Stay sutures of 0-Vicryl were placed in the posterior cervix.   V care of medium size was placed and normal saline was used to inflate the intrauterine balloon . All other instruments were removed. A thompson catheter was placed with clear urine noted. Attention was turned to the abdomen. An 15 blade scaple was then used to make a 10 mm incision  above the umbilicus, vertically. A Veress needle was introduced into the abdominal cavity with anterior tenting of the abdominal wall. Pneumoperitoneum was obtained using CO2 gas to a pressure of 15 mm of Hg. The Veress needle was removed and a 8 mm trocar port was placed without complication. The patient was placed in steep [place] and 8 mm incisions were made in the right and left lower quadrants, after . 25% Marcaine with epinephrine was injected using an epidural  needle. 8 mm robotic trocar ports were placed under laparoscopic visualization without complication. A 5 mm non bladed trocar was then placed in the right upper quadrant again after . 25% Marcaine with epinephrine was injected using a spinal needle. The [employer] robotic system was then brought up to the patient and docked without difficulty. The surgeon then went to the surgeons console. The pelvis was examined with the above noted findings. The right cornual region of the uterus was grasped and the fallopian tubes were removed by using the bipolar sealing device. The utero-ovarian, round and broad ligaments were cauterized using the bipolar cautery and transected using the monopolar scissors. The bladder flap was created across the lower uterine segment. The uterine vessel bundle was skeletonized, cauterized with the bipolar cautery and cut with the monopolar scissors. Attention was turned to the left cornual region where the same transections were performed. The V care cup was then elevated and the uterus retroverted by the 's assistant. A  total hysterectomy was accomplished by the following method: An anterior colpotomy was the preformed using the monopolar scissors and extended laterally.   The uterus was then anteverted and a posterior colpotomy preformed with the monopolar scissors and extended laterally and circumferential until the completion of the colpotomy was obtained. The uterus was then removed vaginally and the balloon bulb was inserted into the vagina  to maintain pneomoperitoneum. The suture was introduced thorough one of the operative ports. The edges of the vaginal cuff were examined and vaginal cuff closure was then completed using V-loc 90 suture . The bladder was filled in a retrograde manner thru the thompson catheter and noted to be close to the anterior cuff but intact. The pelvis was copiously irrigated with Gentamycin irrigant  and all fluid was removed. Hemostasis was again noted. The robot was the then undocked from the trocar and moved away from the patient. The lower quadrant ports were removed and hemostasis was noted. The ports, as they were  8 mm and smaller were closed on the skin. The incisions were closed with subcuticular 3-0 Monocryl suture. The thompson catheter was removed. at the close of the procedure. Sponge, lap, needle and instrument counts were correct x 2. A post procedure pause was done to review the procedure, the findings, the specimen, blood loss, I+O as well as any concerns from the team.  The patient was transferred to the  extubated, in stable condition.     Shara Lantigua MD  September 5, 2017  Pager: [phone]

## 2017-09-05 NOTE — ANESTHESIA PREPROCEDURE EVALUATION
Anesthetic History   No history of anesthetic complications            Review of Systems / Medical History  Patient summary reviewed and pertinent labs reviewed    Pulmonary  Within defined limits                 Neuro/Psych   Within defined limits           Cardiovascular  Within defined limits                     GI/Hepatic/Renal  Within defined limits              Endo/Other        Obesity     Other Findings   Comments:   Risk Factors for Postoperative nausea/vomiting:       History of postoperative nausea/vomiting? NO       Female? YES       Motion sickness? NO       Intended opioid administration for postoperative analgesia? YES      Smoking Abstinence  Current Smoker? NO  Elective Surgery? YES  Seen preoperatively by anesthesiologist or proxy prior to day of surgery? YES  Pt abstained from smoking 24 hours prior to anesthesia?  N/A           Physical Exam    Airway  Mallampati: II  TM Distance: 4 - 6 cm  Neck ROM: normal range of motion   Mouth opening: Normal     Cardiovascular               Dental  No notable dental hx       Pulmonary                 Abdominal  GI exam deferred       Other Findings            Anesthetic Plan    ASA: 2  Anesthesia type: general          Induction: Intravenous  Anesthetic plan and risks discussed with: Patient

## 2017-09-05 NOTE — BRIEF OP NOTE
BRIEF OPERATIVE NOTE    Date of Procedure: 9/5/2017   Preoperative Diagnosis: r10.2 pelvic perineal pain   n81.6 rectocele  Postoperative Diagnosis: r10.2 pelvic perineal pain   n81.6 rectocele    Procedure(s):  davinci total laparoscopic HYSTERECTOMY,  XI ROBOTIC ASSISTED  Surgeon(s) and Role:     * Adiel Wagner MD - Primary         Assistant Staff:   Capo Hassan MD, [address] [place] [address], PGY4    Surgical Staff:  Circ-1: Lisa Graham RN  Circ-Relief: Johnna Marte RN  Scrub Tech-1: Estuardo Myers  Scrub Tech-Relief: David Hurley  Surg Asst-1: Primitivo [place]  Event Time In   Incision Start 1036   Incision Close 1227     Anesthesia: General   Estimated Blood Loss: 25  Specimens:   ID Type Source Tests Collected by Time Destination   1 : Uterus, Cervix, Bilateral Fallopian tubes Preservative   Adiel Wagner MD 9/5/2017 1147 Pathology      Findings:  Normal uterus, tubes, ovaries.  Right ovary with small 1cm cyst  Complications: None  Implants: * No implants in log *

## 2017-09-05 NOTE — PERIOP NOTES
Assumed care of pt from Brenda smith RN for lunch relief. Ilichova 50 of pt given back over to Brenda smith RN.

## 2017-09-06 VITALS
WEIGHT: 230 LBS | BODY MASS INDEX: 34.86 KG/M2 | RESPIRATION RATE: 16 BRPM | OXYGEN SATURATION: 96 % | DIASTOLIC BLOOD PRESSURE: 68 MMHG | HEART RATE: 75 BPM | SYSTOLIC BLOOD PRESSURE: 103 MMHG | TEMPERATURE: 97.9 F | HEIGHT: 68 IN

## 2017-09-06 LAB
ANION GAP SERPL CALC-SCNC: 7 MMOL/L (ref 3–18)
BASOPHILS # BLD: 0 K/UL (ref 0–0.06)
BASOPHILS NFR BLD: 0 % (ref 0–2)
BUN SERPL-MCNC: 12 MG/DL (ref 7–18)
BUN/CREAT SERPL: 15 (ref 12–20)
CALCIUM SERPL-MCNC: 8.8 MG/DL (ref 8.5–10.1)
CHLORIDE SERPL-SCNC: 106 MMOL/L (ref 100–108)
CO2 SERPL-SCNC: 27 MMOL/L (ref 21–32)
CREAT SERPL-MCNC: 0.78 MG/DL (ref 0.6–1.3)
DIFFERENTIAL METHOD BLD: ABNORMAL
EOSINOPHIL # BLD: 0.1 K/UL (ref 0–0.4)
EOSINOPHIL NFR BLD: 1 % (ref 0–5)
ERYTHROCYTE [DISTWIDTH] IN BLOOD BY AUTOMATED COUNT: 13.3 % (ref 11.6–14.5)
GLUCOSE SERPL-MCNC: 112 MG/DL (ref 74–99)
HCT VFR BLD AUTO: 36.9 % (ref 35–45)
HGB BLD-MCNC: 12 G/DL (ref 12–16)
LYMPHOCYTES # BLD: 3 K/UL (ref 0.9–3.6)
LYMPHOCYTES NFR BLD: 25 % (ref 21–52)
MCH RBC QN AUTO: 29.1 PG (ref 24–34)
MCHC RBC AUTO-ENTMCNC: 32.5 G/DL (ref 31–37)
MCV RBC AUTO: 89.3 FL (ref 74–97)
MONOCYTES # BLD: 0.9 K/UL (ref 0.05–1.2)
MONOCYTES NFR BLD: 7 % (ref 3–10)
NEUTS SEG # BLD: 8.2 K/UL (ref 1.8–8)
NEUTS SEG NFR BLD: 67 % (ref 40–73)
PLATELET # BLD AUTO: 282 K/UL (ref 135–420)
PMV BLD AUTO: 10.1 FL (ref 9.2–11.8)
POTASSIUM SERPL-SCNC: 4.1 MMOL/L (ref 3.5–5.5)
RBC # BLD AUTO: 4.13 M/UL (ref 4.2–5.3)
SODIUM SERPL-SCNC: 140 MMOL/L (ref 136–145)
WBC # BLD AUTO: 12.1 K/UL (ref 4.6–13.2)

## 2017-09-06 PROCEDURE — 80048 BASIC METABOLIC PNL TOTAL CA: CPT

## 2017-09-06 PROCEDURE — 51798 US URINE CAPACITY MEASURE: CPT

## 2017-09-06 PROCEDURE — 36415 COLL VENOUS BLD VENIPUNCTURE: CPT

## 2017-09-06 PROCEDURE — 74011250637 HC RX REV CODE- 250/637: Performed by: OBSTETRICS & GYNECOLOGY

## 2017-09-06 PROCEDURE — 85025 COMPLETE CBC W/AUTO DIFF WBC: CPT

## 2017-09-06 RX ADMIN — DOCUSATE SODIUM 100 MG: 100 CAPSULE, LIQUID FILLED ORAL at 09:37

## 2017-09-06 RX ADMIN — OXYCODONE HYDROCHLORIDE AND ACETAMINOPHEN 1 TABLET: 5; 325 TABLET ORAL at 06:47

## 2017-09-06 RX ADMIN — OXYCODONE HYDROCHLORIDE AND ACETAMINOPHEN 1 TABLET: 5; 325 TABLET ORAL at 10:48

## 2017-09-06 RX ADMIN — OXYCODONE HYDROCHLORIDE AND ACETAMINOPHEN 1 TABLET: 5; 325 TABLET ORAL at 02:53

## 2017-09-06 NOTE — PROGRESS NOTES
Jose D   Discharge Planning/ Assessment    Reasons for Intervention: Interviewed patient, she agrees to share her discharge information with her spouse, se below. She was independent prior to admission,  She does not currently have a PCP, referral to [employer]. Demographic information confirmed. Her discharge plan is to return home.      High Risk Criteria  [x] Yes  []No   Physician Referral  [] Yes  [x]No        Date    Nursing Referral  [] Yes  [x]No        Date    Patient/Family Request  [] Yes  [x]No        Date       Resources:    Medicare  [] Yes  [x]No   Medicaid  [] Yes  [x]No   No Resources  [] Yes  [x]No   Private Insurance  [x] Yes  []No [facility]    Name/Phone Number    Other  [] Yes  [x]No        (i.e. Workman's Comp)         Prior Services:    Prior Services  [] Yes  [x]No   Home Health  [] Yes  [x]No   [address]  [] Yes  [x]No        Number of [address]  [] Yes  [x]No       Meals on Wheels  [] Yes  [x]No   Office on Aging  [] Yes  [x]No   Transportation Services  [] Yes  [x]No   Nursing Home  [] Yes  [x]No        Nursing Home Name    [address]  [] Yes  [x]No        [address] Name    Other       Information Source:      Information obtained from  [x] Patient  [] Parent   [] [address]  [] Child  [] Spouse   [] Significant Other/Partner   [] Friend      [] EMS    [] Nursing Home Chart          [x] Other: chart   Chart Review  [x] Yes  []No     Family/Support System:    Patient lives with  [] Alone    [x] Spouse   [] Significant Other  [] Children  [] Caretaker   [] Parent  [] Sibling     [] Other       Other Support System:    Is the patient responsible for care of others  [] Yes  [x]No   Information of person caring for patient on  discharge Self   Managers financial affairs independently  [x] Yes  []No   If no, explain:      Status Prior to Admission:    Mental Status  [x] Awake  [x] Alert  [x] Oriented [] Quiet/Calm [] Lethargic/Sedated   [] Disoriented  [] Restless/Anxious  [] Combative   Personal Care  [] Dependent  [x] Independent Personal Care  [] Requires Assistance   Meal Preparation Ability  [x] Independent   [] Standby Assistance   [] Minimal Assistance   [] Moderate Assistance  [] Maximum Assistance     [] Total Assistance   Chores  [x] Independent with Chores   [] N/A Nursing Home Resident   [] Requires Assistance   Bowel/Bladder  [x] Continent  [] Catheter  [] Incontinent  [] Ostomy Self-Care    [] Urine Diversion Self-Care  [] Maximum Assistance     [] Total Assistance   Number of Persons needed for assistance    DME at home  [] [address], Rainell Kaur  [] [address], Straight   [] Commode    [] Bathroom/Grab Bars  [] Hospital Bed  [] Nebulizer  [] Oxygen           [] Raised Toilet Seat  [] Shower Chair  [] Side Rails for Bed   [] Tub Transfer Bench   [] Ara Soto  [] Faustino Waters      [] Other:   Vendor      Treatment Presently Receiving:    Current Treatments  [] Chemotherapy  [] Dialysis  [] Insulin  [] IVAB [x] IVF   [] O2  [] PCA   [] PT   [] RT   [] Tube Feedings   [] Wound Care     Psychosocial Evaluation:    Verbalized Knowledge of Disease Process  [x] Patient  [x]Family   Coping with Disease Process  [x] Patient  [x]Family   Requires Further Counseling Coping with Disease Process  [] Patient  []Family     Identified Projected Needs:    Home Health Aid  [] Yes  [x]No   Transportation  [] Yes  [x]No   Education  [] Yes  [x]No        Specific Education     Financial Counseling  [] Yes  [x]No   Inability to Care for Self/Will Require 24 hour care  [] Yes  [x]No   Pain Management  [] Yes  [x]No   Home Infusion Therapy  [] Yes  [x]No   Oxygen Therapy  [] Yes  [x]No   DME  [] Yes  [x]No   Long Term Care Placement  [] Yes  [x]No   Rehab  [] Yes  [x]No   Physical Therapy  [] Yes  [x]No   Needs Anticipated At This Time  [] Yes  [x]No     Intra-Hospital Referral:    [facility]  [] Yes  [x]No    [] Yes  [x]No   Patient Representative  [] Yes  [x]No   Staff for Teaching Needs  [] Yes  [x]No   Specialty Teaching Needs     Diabetic Educator  [] Yes  [x]No   Referral for Diabetic Educator Needed  [] Yes  [x]No  If Yes, place order for Nutritionist or Diabetic Consult     Tentative Discharge Plan:    Home with No Services  [x] Yes  []No   Home with [address]  [] Yes  [x]No        If Yes, specify type    Home Care Program  [] Yes  [x]No        If Yes, specify type    Meals on Wheels  [] Yes  [x]No   Office of Aging  [] Yes  [x]No   NHP  [] Yes  [x]No   Return to the Nursing Home  [] Yes  [x]No   Rehab Therapy  [] Yes  [x]No   Acute Rehab  [] Yes  [x]No   Subacute Rehab  [] Yes  [x]No   Private Care  [] Yes  [x]No   Substance Abuse Referral  [] Yes  [x]No   Transportation  [] Yes  [x]No   Chore Service  [] Yes  [x]No   Inpatient Hospice  [] Yes  [x]No   OP RT  [] Yes  [x] No   OP Hemo  [] Yes  [x] No   OP PT  [] Yes  [x]No   Support Group  [] Yes  [x]No   Reach to Recovery  [] Yes  [x]No   OP Oncology Clinic  [] Yes  [x]No   Clinic Appointment  [] Yes  [x]No   DME  [] Yes  [x]No   Comments    Name of D/C Planner or  Given to Patient or Family Virginia Dodge RN   Phone Number [phone]   Date Sept 6, 2017   Time 737 am   If you are discharged home, whom do you designate to participate in your discharge plan and receive any information needed?      Enter name of [address]  spouse        Phone # of [id number] [phone]        Address of [address], [place], [address] [facility]        Updated Sept 6, 2017        Patient refused to designate any           individual

## 2017-09-06 NOTE — PROGRESS NOTES
Patient has designated her spouse to participate in his/her discharge plan and to receive any needed information.      Name:   Alis Ohm  spouse   [phone]   [address] [facility], [address]   Sept 6, 2017   Address:  Phone number:

## 2017-09-06 NOTE — PROGRESS NOTES
Problem: Falls - Risk of  Goal: *Absence of Falls  Document Amandeep Fall Risk and appropriate interventions in the flowsheet.    Outcome: Progressing Towards Goal  Fall Risk Interventions:              Medication Interventions: Patient to call before getting OOB     Elimination Interventions: Call light in reach, Patient to call for help with toileting needs

## 2017-09-06 NOTE — PROGRESS NOTES
Problem: Falls - Risk of  Goal: *Absence of Falls  Document Amandeep Fall Risk and appropriate interventions in the flowsheet.    Outcome: Progressing Towards Goal  Fall Risk Interventions:  Mobility Interventions: Patient to call before getting OOB           Medication Interventions: Patient to call before getting OOB, Teach patient to arise slowly     Elimination Interventions: Call light in reach, Patient to call for help with toileting needs, Toilet paper/wipes in reach

## 2017-09-06 NOTE — PROGRESS NOTES
Care Management Interventions  PCP Verified by CM: Yes  Mode of Transport at Discharge:  Other (see comment)  Transition of Care Consult (CM Consult): Discharge Planning  MyChart Signup: No  Discharge Durable Medical Equipment: No  Physical Therapy Consult: No  Occupational Therapy Consult: No  Speech Therapy Consult: No  Current Support Network: Lives with Spouse  Confirm Follow Up Transport: Family  Plan discussed with Pt/Family/Caregiver: Yes  Discharge Location  Discharge Placement: Home

## 2017-09-06 NOTE — DISCHARGE INSTRUCTIONS
DISCHARGE SUMMARY from Nurse    The following personal items are in your possession at time of discharge:    Dental Appliances: None  Visual Aid: None                            PATIENT INSTRUCTIONS:    After general anesthesia or intravenous sedation, for 24 hours or while taking prescription Narcotics:  · Limit your activities  · Do not drive and operate hazardous machinery  · Do not make important personal or business decisions  · Do  not drink alcoholic beverages  · If you have not urinated within 8 hours after discharge, please contact your surgeon on call. Report the following to your surgeon:  · Excessive pain, swelling, redness or odor of or around the surgical area  · Temperature over 100.5  · Nausea and vomiting lasting longer than 4 hours or if unable to take medications  · Any signs of decreased circulation or nerve impairment to extremity: change in color, persistent  numbness, tingling, coldness or increase pain  · Any questions        What to do at Home:  Recommended activity: No lifting, Driving, or Strenuous exercise for 4 weeks. If you experience any of the following symptoms severe pain, nausea and vomiting, fever above 100.5, bleeding or drainage from incision, shortness of breath, please follow up with Dr. James Dasilva. *  Please give a list of your current medications to your Primary Care Provider. *  Please update this list whenever your medications are discontinued, doses are      changed, or new medications (including over-the-counter products) are added. *  Please carry medication information at all times in case of emergency situations. These are general instructions for a healthy lifestyle:    No smoking/ No tobacco products/ Avoid exposure to second hand smoke    Surgeon General's Warning:  Quitting smoking now greatly reduces serious risk to your health.     Obesity, smoking, and sedentary lifestyle greatly increases your risk for illness    A healthy diet, regular physical exercise & weight monitoring are important for maintaining a healthy lifestyle    You may be retaining fluid if you have a history of heart failure or if you experience any of the following symptoms:  Weight gain of 3 pounds or more overnight or 5 pounds in a week, increased swelling in our hands or feet or shortness of breath while lying flat in bed. Please call your doctor as soon as you notice any of these symptoms; do not wait until your next office visit. Recognize signs and symptoms of STROKE:    F-face looks uneven    A-arms unable to move or move unevenly    S-speech slurred or non-existent    T-time-call 911 as soon as signs and symptoms begin-DO NOT go       Back to bed or wait to see if you get better-TIME IS BRAIN. Warning Signs of HEART ATTACK     Call 911 if you have these symptoms:   Chest discomfort. Most heart attacks involve discomfort in the center of the chest that lasts more than a few minutes, or that goes away and comes back. It can feel like uncomfortable pressure, squeezing, fullness, or pain.  Discomfort in other areas of the upper body. Symptoms can include pain or discomfort in one or both arms, the back, neck, jaw, or stomach.  Shortness of breath with or without chest discomfort.  Other signs may include breaking out in a cold sweat, nausea, or lightheadedness. Don't wait more than five minutes to call 911 - MINUTES MATTER! Fast action can save your life. Calling 911 is almost always the fastest way to get lifesaving treatment. Emergency Medical Services staff can begin treatment when they arrive -- up to an hour sooner than if someone gets to the hospital by car. The discharge information has been reviewed with the patient. The patient verbalized understanding. Discharge medications reviewed with the patient and appropriate educational materials and side effects teaching were provided. Patient armband removed and shredded.           Patient Instructions:  - No heavy lifting for 4 weeks (>20lbs), pelvic rest for 6 weeks (no intercourse, tampons, douching). - No driving for 1 week or while taking Rx pain medication.   - Begin taking a stool softener (Colace). May take a laxative (Miralax) for constipation.   - Contact office for fevers >101F, pain not controlled with oral pain medicine, or heavy vaginal bleeding (soaking 1 pad every 1-2 hours). May expect to see some light vaginal spotting/discharge (brown - red).      Diet: Regular Diet     Wound Care: OK to shower, no tub baths. Skin glue and stitches will dissolve on their own, do not remove.      Follow-up with 2 weeks as previously scheduled. .

## 2017-09-06 NOTE — DISCHARGE SUMMARY
Discharge Summary     Patient ID:  Samia Smith  [id number]  52 y.o.  1980    Admit Date: 9/5/2017    Discharge Date: 9/6/2017    Admission Diagnoses: r10.2 pelvic perineal pain   n81.6 rectocele  S/P hysterectomy    Discharge Diagnoses:    Problem List as of 9/6/2017  Date Reviewed: 9/5/2017          Codes Class Noted - Resolved    S/P hysterectomy ICD-10-CM: Z90.710  ICD-9-CM: V88.01  9/5/2017 - Present               Admission Condition: good     Discharge Condition: good    Last Procedure: Procedure(s):  davinci total laparoscopic HYSTERECTOMY,  XI ROBOTIC ASSISTED      Hospital Course:   Patient presented for scheduled surgery. She underwent an uncomplicated RA-TLH + bilateral salpingectomy. Postoperative course was uneventful. Patient with urinary retention on POD#0 overnight and with bilateral calf pain tenderness without concern for DVT, no swelling. By POD#1 she was meeting all goals including ambulating, tolerating regular diet, voiding spontaneously, and had adequate pain control. Thus she was discharged home in stable condition. Consults: none    Significant Diagnostic Studies: none    Disposition: home    Patient Instructions: There are no discharge medications for this patient. Patient Instructions:  - No heavy lifting for 4 weeks (>20lbs), pelvic rest for 6 weeks (no intercourse, tampons, douching). - No driving for 1 week or while taking Rx pain medication.   - Begin taking a stool softener (Colace). May take a laxative (Miralax) for constipation.   - Contact office for fevers >101F, pain not controlled with oral pain medicine, or heavy vaginal bleeding (soaking 1 pad every 1-2 hours). May expect to see some light vaginal spotting/discharge (brown - red). Diet: Regular Diet    Wound Care: OK to shower, no tub baths. Skin glue and stitches will dissolve on their own, do not remove. Follow-up with 2 weeks as previously scheduled. .      Signed:  Neville Castellanos MD  9/6/2017  6:07 AM

## 2017-09-06 NOTE — PROGRESS NOTES
Gynecology Progress Note    Patient: Elizabeth Maharaj MRN: [id number]  SSN: [id number]    YOB: 1980  Age: 42 y.o. Sex: female      Admit Date: 9/5/2017    LOS: 0 days     Assessment/Plan:     42 y.o. F POD#1 from RA-TLH + bilateral salpingectomy. Labs reviewed, VSS.     - Pain controlled with percocet   - CBC from 0400 still pending. VSS.  - Currently tolerating regular diet. Advance diet as tolerated. - Voiding spontaneously, though with pain and urinary retention overnight. Patient concerned about going home right now due to bladder pain and inability to completely empty. - not yet passing flatus, no bm.   - DVT ppx: SCDs and ambulation, continue. - Encourage OOB to chair for meals and ambulation in hallway  - Continue routine post-op care. Disposition: Home today with followup with Dr. Marni Haskins in 2 weeks when urinating appropriately. Subjective:     NAEO. Patient complains of pain and urinary retention overnight causing her to not be able to sleep. Tolerating regular without nausea/vomiting; voiding spontaneously but states that overnight she had difficulty; ambulating with minimal assistance. Complains of bilateral calf tenderness to touch- no bruising. Patient not yet ready for discharge this AM- maybe closer to noon. Objective:     Vitals:    09/05/17 1516 09/05/17 1549 09/06/17 0042 09/06/17 0512   BP:  139/83 107/72 109/70   Pulse: 93 86 79 73   Resp: 19 15 15 15   Temp:  97.5 °F (36.4 °C) 98.1 °F (36.7 °C) 98 °F (36.7 °C)   SpO2: 97% 95% 96% 96%   Weight:       Height:            Intake and Output:  Current Shift: 09/05 1901 - 09/06 0700  In: 150 [P.O.:150]  Out: 550 [Urine:550]  Last three shifts: 09/04 0701 - 09/05 1900  In: 1500 [I.V.:1500]  Out: 580 [Urine:500]    Physical Exam:   Constitutional: NAD, Alert. Cardiovascular: RRR, no murmur   Respiratory: CTAB   Abdomen: Soft, non-distended, appropriately tender to palpation.  Trocar sites x4 well approximated, with skin glue covering. No hematomas   Extremities: No edema, warm, SCDs in place.       Lab/Data Review:  Recent Results (from the past 24 hour(s))   HCG URINE, QL. - POC    Collection Time: 09/05/17  8:31 AM   Result Value Ref Range    Pregnancy test,urine (POC) NEGATIVE  NEG           Ezra Sacks, MD PGY4  [facility]  9/6/2017, 5:42 AM  [phone]

## 2017-09-06 NOTE — ROUTINE PROCESS
Bedside and Verbal shift change report given to Charla Strickland RN (oncoming nurse) by Elif Marvin RN(offgoing nurse). Report included the following information SBAR, Kardex and MAR.

## 2017-09-06 NOTE — PROGRESS NOTES
Problem: Discharge Planning  Goal: *Discharge to safe environment  Outcome: Progressing Towards Goal  plan is to discharge to a safe environment

## 2017-09-07 NOTE — PROGRESS NOTES
2060 - received pt in room. Pt resting in bed. AO. Dad at beside. 0477 - assessment completed. Pt is AOx4. VSS. Call bell at bedside, family in room. 1051 - bladder scan 307, pt previously voided 100cc. Having problems emptying her bladder. 1130 - received order to place thompson catheter, pt refused. Pt requested to take at shower and perhaps attempt to use bathroom while in shower with warm water. 1214 - bladder scan = 157. Pt was able to void while under warm water. 12 - Dr. Tanmay Toussaint rounded, advised of output, gave ok to discharge. 1420 - pt d/c'd from floor via wheelchair. No distress noted.

## 2017-10-10 NOTE — PROGRESS NOTES
[facility] [address], [address] [facility], [facility] [address]  Phone: [phone]  Fax: [phone] SUMMARY  Patient Name: Edison Hussein : 1980   Treatment/Medical Diagnosis: Pelvic and perineal pain [R10.2]   Referral Source: Nina Matos MD     Date of Initial Visit: 2017 Attended Visits: 1 Missed Visits: 0     SUMMARY OF TREATMENT  Patient seen for initial evaluation then did not return to PT secondary to having surgery. RECOMMENDATIONS  Other: Discontinue PT secondary to surgery. .  Patient to continue on home exercise program.  If you have any questions/comments please contact us directly at [phone]. Thank you for allowing us to assist in the care of your patient. Therapist Signature:  Miroslava Orr PT Date: 10/10/2017     Time: 12:51 PM

## 2018-09-10 ENCOUNTER — IMPORTED ENCOUNTER (OUTPATIENT)
Dept: URBAN - METROPOLITAN AREA CLINIC 1 | Facility: CLINIC | Age: 38
End: 2018-09-10

## 2018-09-10 PROBLEM — H18.832: Noted: 2018-09-10

## 2018-09-10 PROCEDURE — 92012 INTRM OPH EXAM EST PATIENT: CPT

## 2018-09-10 NOTE — PATIENT DISCUSSION
1. Recurrent Erosion Syndrome OS- resolving. Begin Refresh Celluvisc QHS OU Increase ATs to BID-TID OU Routinely (If symptoms persists increase Tears to QID OU and AT Radha). 2. SHOSHANA OU 3. H/o PRK OS (PMG)Return for an appointment in 6 months 27 with Dr. Jai Rodas.

## 2018-09-11 ENCOUNTER — IMPORTED ENCOUNTER (OUTPATIENT)
Dept: URBAN - METROPOLITAN AREA CLINIC 1 | Facility: CLINIC | Age: 38
End: 2018-09-11

## 2018-09-11 PROBLEM — H18.832: Noted: 2018-09-11

## 2018-09-11 PROCEDURE — 92012 INTRM OPH EXAM EST PATIENT: CPT

## 2018-09-11 NOTE — PATIENT DISCUSSION
1. Recurrent Erosion Syndrome OS- resolving but symptoms worse per patient. Use Refresh Celluvisc QHS OU Use ATs to BID-TID OU Routinely (If symptoms persists increase Tears to QID OU and AT Kenny). Begin Rosa Isela 128 5% kenny QHS OS Begin Besivance BID OS (Samples x4 given) Recheck patient in 1 week; Consider CL insertion if no improvement vs. referral to Corneal Specialist. 2. SHOSHANA OU 3. H/o PRK OS (PMG)Return for an appointment in next week 10 (recheck K) with Dr. Lakhwinder Conde.

## 2021-10-29 ENCOUNTER — HOSPITAL ENCOUNTER (OUTPATIENT)
Dept: PREADMISSION TESTING | Age: 41
Discharge: HOME | End: 2021-10-29
Payer: SELF-PAY

## 2021-10-29 VITALS — BODY MASS INDEX: 34.51 KG/M2 | WEIGHT: 233.03 LBS | HEIGHT: 69 IN

## 2021-10-29 LAB
BASOPHILS # BLD: 0.1 K/UL (ref 0–0.1)
BASOPHILS NFR BLD: 1 % (ref 0–1)
DIFFERENTIAL METHOD BLD: NORMAL
EOSINOPHIL # BLD: 0.3 K/UL (ref 0–0.4)
EOSINOPHIL NFR BLD: 3 % (ref 0–7)
ERYTHROCYTE [DISTWIDTH] IN BLOOD BY AUTOMATED COUNT: 13 % (ref 11.5–14.5)
HCT VFR BLD AUTO: 42.5 % (ref 35–47)
HGB BLD-MCNC: 13.7 G/DL (ref 11.5–16)
IMM GRANULOCYTES # BLD AUTO: 0 K/UL (ref 0–0.04)
IMM GRANULOCYTES NFR BLD AUTO: 0 % (ref 0–0.5)
LYMPHOCYTES # BLD: 2.7 K/UL (ref 0.8–3.5)
LYMPHOCYTES NFR BLD: 31 % (ref 12–49)
MCH RBC QN AUTO: 30.2 PG (ref 26–34)
MCHC RBC AUTO-ENTMCNC: 32.2 G/DL (ref 30–36.5)
MCV RBC AUTO: 93.6 FL (ref 80–99)
MONOCYTES # BLD: 0.7 K/UL (ref 0–1)
MONOCYTES NFR BLD: 8 % (ref 5–13)
NEUTS SEG # BLD: 4.9 K/UL (ref 1.8–8)
NEUTS SEG NFR BLD: 57 % (ref 32–75)
NRBC # BLD: 0 K/UL (ref 0–0.01)
NRBC BLD-RTO: 0 PER 100 WBC
PLATELET # BLD AUTO: 327 K/UL (ref 150–400)
PMV BLD AUTO: 10.1 FL (ref 8.9–12.9)
RBC # BLD AUTO: 4.54 M/UL (ref 3.8–5.2)
WBC # BLD AUTO: 8.7 K/UL (ref 3.6–11)

## 2021-10-29 PROCEDURE — 36415 COLL VENOUS BLD VENIPUNCTURE: CPT

## 2021-10-29 PROCEDURE — 85025 COMPLETE CBC W/AUTO DIFF WBC: CPT

## 2021-11-03 NOTE — PERIOP NOTES
COVID QUESTIONS ANSWERED/SCHED PAT 10-30-21 @ 1400 ALSO SEEING DR Currie Numbers FOR PREOP/COVID PCR TEST WILL BE DONE LOCALLY ([place])GAVE OUR FAX NUMBER FOR RESULTS AND INFORMED HER TO CARRY A COPY TO HOSP DAY OF SURGERY/NOT COVID VACCINATED.   BY SALVATORE BERNABE

## 2021-11-11 PROBLEM — Z41.1 ENCOUNTER FOR COSMETIC SURGERY: Status: ACTIVE | Noted: 2021-11-11

## 2021-11-11 NOTE — H&P
Fabi Medrano  : 1980  DOS: 2021    Chief Complaint: consultation       Allergies: No Known Drug Allergies (NKDA)       Medications: None Indicated       Medical History: Height: 5' 9\", Weight: 231 lbs    Pulmonary System: Negative  Cardiac System: Negative  Blood and Liver Systems: Negative  Neurologic and Endocrine Systems: Negative  GI,  and Reproductive Systems: Negative  Cancer: Negative   Surgical History: Hysterectomy  ,  C section       Family History: Breast Cancer Paternal Grandmother       Social History: Smoking Status  4 Never smoker    Pregnancy   3 Children          Ms. Delia Costa is a pleasant 75-year-old female who presents today for a cosmetic consultation regarding her desire to undergo elective body contouring surgery. She admits to struggling with her weight and obesity for a number of years and despite her efforts at diet and exercise has been unable to obtain the look she desires regarding poor tissue tone and muscle tone on her abdomen following the birth of 3 children that are ages 6, 13, and 21. She also has areas of fat on her medial thighs and posteriorly over the flank that she would like to address as well with liposuction. She also struggles from pelvic floor prolapse and was told by her gynecologist that an abdominoplasty might help with some of those symptoms. She is 5 feet 9 inches tall and 231 pounds with a BMI of 34. Review of systems reveals normal heart and lung sounds. Examination demonstrates good skin tone and a long trunk, but she does have poor muscle tone consistent with childbirth, and an overhanging fold or pannus anteriorly with very poor tissue tone over the mons/suprapubic region. The soft tissue laxity and fullness extends laterally to the iliac crest and the anterolateral and posterior lateral flank or love handle region.   She also has a moderate amount of lipodystrophy and fat on the lateral thigh and anterior thigh, and the medial thigh with poor skin tone. I had a lengthy discussion with Heidi Greene regarding abdominoplasty. Heidi Greene understands this is performed under a general anesthetic on an outpatient basis. I diagrammed on paper and patients anterior abdomen where the low transverse incision and the umbilical incision is made. Heidi Greene understands the elevation of the skin and fat layer off the muscle fascial layer, permanent plication or tightening of the rectus fascial layer with Prolene sutures, flexing at the waist and excising the excess skin and fat, closure of the wounds in layers with dissolvable sutures, the use of a suction drain for up to 2 weeks, and surgical garments with activity restrictions while healing which can be extended beyond 6 weeks. There is no exercise for 4 weeks. The risks and complications include but are not limited to infection, pain, bleeding, hematoma's requiring re-operation, skin sensitivity changes, vascular compromise to tissues resulting in partial or complete loss of tissues, resultant open wounds, the need for long term wound care and dressing changes and scarring, irregularities and asymmetries requiring touch-up and revision surgery, an unacceptable cosmetic result, and other things including cardiac and pulmonary risks that include death. I had a lengthy discussion with Heidi Greene regarding body contouring with liposuction. Heidi Greene understands this is performed under a general anesthetic and in most cases as an outpatient. We also discussed the tumescent technique and the differences between standard suction-assisted and ultrasonic-assisted liposuction.  Patient understands there is an access incision made, tumescent fluid is injected into the areas of fat to be treated, ultrasonic technology is applied first with our Blomming 3000 generator, standard suction cannulas are then used to aspirate the emulsified liquefied fat, and the access incision is closed with a dissolvable suture, and compression garments with TopiFoam are placed and used for at least 6 weeks with associated activity restrictions. There is no exercise for 4 weeks. The risks and complications include but are not limited to infection, pain, bleeding, hematoma's requiring re-operation, skin sensitivity changes, vascular compromise to tissues resulting in partial or complete loss of tissues, resultant open wounds, the need for long term wound care and dressing changes and scarring, irregularities and asymmetries requiring touch-up and revision surgery, an unacceptable cosmetic result, and other things including cardiac and pulmonary risks that include death. To address the proximal medial thighs, in addition to liposuction I would recommend a small crescent skin excision proximally to improve the skin tone so that she does not end up with loose skin and a less than ideal contour and appearance. Her questions were answered, and pictures were taken. She will meet with Chayito to discuss the fees for liposuction of the flanks in the medial thighs and an extended abdominoplasty. The patient was counseled about the risks of eliana Covid-19 during their perioperative period and any recovery window from their procedure. The patient was made aware that eliana Covid-19 may worsen their prognosis for recovering from their procedure and lend to a higher morbidity and/or mortality risk. All material risks, benefits, and reasonable alternatives including postponing the procedure were discussed. The patient DOES wish to proceed with their procedure at this time. Johnny Tafoya M.D.  FACS

## 2021-11-12 ENCOUNTER — ANESTHESIA (OUTPATIENT)
Dept: MEDSURG UNIT | Age: 41
End: 2021-11-12
Payer: SELF-PAY

## 2021-11-12 ENCOUNTER — ANESTHESIA EVENT (OUTPATIENT)
Dept: MEDSURG UNIT | Age: 41
End: 2021-11-12
Payer: SELF-PAY

## 2021-11-12 ENCOUNTER — HOSPITAL ENCOUNTER (OUTPATIENT)
Age: 41
Setting detail: OUTPATIENT SURGERY
Discharge: HOME | End: 2021-11-12
Attending: SURGERY | Admitting: SURGERY
Payer: SELF-PAY

## 2021-11-12 VITALS
HEART RATE: 91 BPM | RESPIRATION RATE: 21 BRPM | SYSTOLIC BLOOD PRESSURE: 113 MMHG | DIASTOLIC BLOOD PRESSURE: 77 MMHG | WEIGHT: 233 LBS | BODY MASS INDEX: 34.41 KG/M2 | OXYGEN SATURATION: 94 % | TEMPERATURE: 98.9 F

## 2021-11-12 PROCEDURE — 74011250636 HC RX REV CODE- 250/636: Performed by: NURSE ANESTHETIST, CERTIFIED REGISTERED

## 2021-11-12 PROCEDURE — 77030031139 HC SUT VCRL2 J&J -A: Performed by: SURGERY

## 2021-11-12 PROCEDURE — 77030008462 HC STPLR SKN PROX J&J -A: Performed by: SURGERY

## 2021-11-12 PROCEDURE — 77030041680 HC PNCL ELECSURG SMK EVAC CNMD -B: Performed by: SURGERY

## 2021-11-12 PROCEDURE — 77030008538 HC TBNG LIPO SUC WELL -B: Performed by: SURGERY

## 2021-11-12 PROCEDURE — 76060000070 HC AMB SURG ANES 5 TO 5.5 HR: Performed by: SURGERY

## 2021-11-12 PROCEDURE — C9290 INJ, BUPIVACAINE LIPOSOME: HCPCS | Performed by: SURGERY

## 2021-11-12 PROCEDURE — 77030011264 HC ELECTRD BLD EXT COVD -A: Performed by: SURGERY

## 2021-11-12 PROCEDURE — 77030008684 HC TU ET CUF COVD -B: Performed by: NURSE ANESTHETIST, CERTIFIED REGISTERED

## 2021-11-12 PROCEDURE — 74011250637 HC RX REV CODE- 250/637: Performed by: ANESTHESIOLOGY

## 2021-11-12 PROCEDURE — 74011000250 HC RX REV CODE- 250: Performed by: SURGERY

## 2021-11-12 PROCEDURE — 77030040361 HC SLV COMPR DVT MDII -B: Performed by: SURGERY

## 2021-11-12 PROCEDURE — 2709999900 HC NON-CHARGEABLE SUPPLY

## 2021-11-12 PROCEDURE — 77030002966 HC SUT PDS J&J -A: Performed by: SURGERY

## 2021-11-12 PROCEDURE — 76210000037 HC AMBSU PH I REC 2 TO 2.5 HR: Performed by: SURGERY

## 2021-11-12 PROCEDURE — 77030019702 HC WRP THER MENM -C: Performed by: SURGERY

## 2021-11-12 PROCEDURE — 77030018673: Performed by: SURGERY

## 2021-11-12 PROCEDURE — 74011000258 HC RX REV CODE- 258: Performed by: SURGERY

## 2021-11-12 PROCEDURE — 77030026438 HC STYL ET INTUB CARD -A: Performed by: NURSE ANESTHETIST, CERTIFIED REGISTERED

## 2021-11-12 PROCEDURE — 77030040922 HC BLNKT HYPOTHRM STRY -A

## 2021-11-12 PROCEDURE — 2709999900 HC NON-CHARGEABLE SUPPLY: Performed by: SURGERY

## 2021-11-12 PROCEDURE — 77030013079 HC BLNKT BAIR HGGR 3M -A: Performed by: NURSE ANESTHETIST, CERTIFIED REGISTERED

## 2021-11-12 PROCEDURE — 76030000010 HC AMB SURG OR TIME 5 TO 5.5: Performed by: SURGERY

## 2021-11-12 PROCEDURE — 77030040504 HC DRN WND MDII -B: Performed by: SURGERY

## 2021-11-12 PROCEDURE — 77030008534 HC TBNG LIPOSUC BYRO -B: Performed by: SURGERY

## 2021-11-12 PROCEDURE — 74011250636 HC RX REV CODE- 250/636: Performed by: SURGERY

## 2021-11-12 PROCEDURE — 77030011265 HC ELECTRD BLD HEX COVD -A: Performed by: SURGERY

## 2021-11-12 PROCEDURE — 77030002933 HC SUT MCRYL J&J -A: Performed by: SURGERY

## 2021-11-12 PROCEDURE — 77030008552 HC TBNG SMK EVAC BFLF -A: Performed by: SURGERY

## 2021-11-12 PROCEDURE — 77030005513 HC CATH URETH FOL11 MDII -B: Performed by: SURGERY

## 2021-11-12 PROCEDURE — 74011000250 HC RX REV CODE- 250: Performed by: NURSE ANESTHETIST, CERTIFIED REGISTERED

## 2021-11-12 PROCEDURE — 74011250636 HC RX REV CODE- 250/636: Performed by: ANESTHESIOLOGY

## 2021-11-12 PROCEDURE — 77030038692 HC WND DEB SYS IRMX -B: Performed by: SURGERY

## 2021-11-12 PROCEDURE — 77030002986 HC SUT PROL J&J -A: Performed by: SURGERY

## 2021-11-12 PROCEDURE — 77030002996 HC SUT SLK J&J -A: Performed by: SURGERY

## 2021-11-12 RX ORDER — DIAZEPAM 10 MG/2ML
5 INJECTION INTRAMUSCULAR
Status: COMPLETED | OUTPATIENT
Start: 2021-11-12 | End: 2021-11-12

## 2021-11-12 RX ORDER — MIDAZOLAM HYDROCHLORIDE 1 MG/ML
INJECTION, SOLUTION INTRAMUSCULAR; INTRAVENOUS AS NEEDED
Status: DISCONTINUED | OUTPATIENT
Start: 2021-11-12 | End: 2021-11-12 | Stop reason: HOSPADM

## 2021-11-12 RX ORDER — BUPIVACAINE HYDROCHLORIDE 2.5 MG/ML
30 INJECTION, SOLUTION EPIDURAL; INFILTRATION; INTRACAUDAL ONCE
Status: DISCONTINUED | OUTPATIENT
Start: 2021-11-12 | End: 2021-11-12 | Stop reason: HOSPADM

## 2021-11-12 RX ORDER — SODIUM CHLORIDE 0.9 % (FLUSH) 0.9 %
5-40 SYRINGE (ML) INJECTION AS NEEDED
Status: DISCONTINUED | OUTPATIENT
Start: 2021-11-12 | End: 2021-11-12 | Stop reason: HOSPADM

## 2021-11-12 RX ORDER — SUCCINYLCHOLINE CHLORIDE 20 MG/ML
INJECTION INTRAMUSCULAR; INTRAVENOUS AS NEEDED
Status: DISCONTINUED | OUTPATIENT
Start: 2021-11-12 | End: 2021-11-12 | Stop reason: HOSPADM

## 2021-11-12 RX ORDER — BUPIVACAINE HYDROCHLORIDE 2.5 MG/ML
INJECTION, SOLUTION EPIDURAL; INFILTRATION; INTRACAUDAL AS NEEDED
Status: DISCONTINUED | OUTPATIENT
Start: 2021-11-12 | End: 2021-11-12 | Stop reason: HOSPADM

## 2021-11-12 RX ORDER — FENTANYL CITRATE 50 UG/ML
INJECTION, SOLUTION INTRAMUSCULAR; INTRAVENOUS AS NEEDED
Status: DISCONTINUED | OUTPATIENT
Start: 2021-11-12 | End: 2021-11-12 | Stop reason: HOSPADM

## 2021-11-12 RX ORDER — PHENYLEPHRINE HCL IN 0.9% NACL 0.4MG/10ML
SYRINGE (ML) INTRAVENOUS AS NEEDED
Status: DISCONTINUED | OUTPATIENT
Start: 2021-11-12 | End: 2021-11-12 | Stop reason: HOSPADM

## 2021-11-12 RX ORDER — ONDANSETRON 2 MG/ML
INJECTION INTRAMUSCULAR; INTRAVENOUS AS NEEDED
Status: DISCONTINUED | OUTPATIENT
Start: 2021-11-12 | End: 2021-11-12 | Stop reason: HOSPADM

## 2021-11-12 RX ORDER — LIDOCAINE HYDROCHLORIDE 20 MG/ML
INJECTION, SOLUTION EPIDURAL; INFILTRATION; INTRACAUDAL; PERINEURAL AS NEEDED
Status: DISCONTINUED | OUTPATIENT
Start: 2021-11-12 | End: 2021-11-12 | Stop reason: HOSPADM

## 2021-11-12 RX ORDER — HYDROMORPHONE HYDROCHLORIDE 2 MG/ML
INJECTION, SOLUTION INTRAMUSCULAR; INTRAVENOUS; SUBCUTANEOUS AS NEEDED
Status: DISCONTINUED | OUTPATIENT
Start: 2021-11-12 | End: 2021-11-12 | Stop reason: HOSPADM

## 2021-11-12 RX ORDER — MIDAZOLAM HYDROCHLORIDE 1 MG/ML
1 INJECTION, SOLUTION INTRAMUSCULAR; INTRAVENOUS AS NEEDED
Status: DISCONTINUED | OUTPATIENT
Start: 2021-11-12 | End: 2021-11-12 | Stop reason: HOSPADM

## 2021-11-12 RX ORDER — DEXMEDETOMIDINE HYDROCHLORIDE 100 UG/ML
INJECTION, SOLUTION INTRAVENOUS AS NEEDED
Status: DISCONTINUED | OUTPATIENT
Start: 2021-11-12 | End: 2021-11-12 | Stop reason: HOSPADM

## 2021-11-12 RX ORDER — SODIUM CHLORIDE 0.9 % (FLUSH) 0.9 %
5-40 SYRINGE (ML) INJECTION EVERY 8 HOURS
Status: DISCONTINUED | OUTPATIENT
Start: 2021-11-12 | End: 2021-11-12 | Stop reason: HOSPADM

## 2021-11-12 RX ORDER — OXYCODONE HYDROCHLORIDE 5 MG/1
5 TABLET ORAL
Status: DISCONTINUED | OUTPATIENT
Start: 2021-11-12 | End: 2021-11-12 | Stop reason: HOSPADM

## 2021-11-12 RX ORDER — SCOLOPAMINE TRANSDERMAL SYSTEM 1 MG/1
1 PATCH, EXTENDED RELEASE TRANSDERMAL ONCE
Status: DISCONTINUED | OUTPATIENT
Start: 2021-11-12 | End: 2021-11-12 | Stop reason: HOSPADM

## 2021-11-12 RX ORDER — ROCURONIUM BROMIDE 10 MG/ML
INJECTION, SOLUTION INTRAVENOUS AS NEEDED
Status: DISCONTINUED | OUTPATIENT
Start: 2021-11-12 | End: 2021-11-12 | Stop reason: HOSPADM

## 2021-11-12 RX ORDER — SODIUM CHLORIDE, SODIUM LACTATE, POTASSIUM CHLORIDE, CALCIUM CHLORIDE 600; 310; 30; 20 MG/100ML; MG/100ML; MG/100ML; MG/100ML
INJECTION, SOLUTION INTRAVENOUS
Status: DISCONTINUED | OUTPATIENT
Start: 2021-11-12 | End: 2021-11-12 | Stop reason: HOSPADM

## 2021-11-12 RX ORDER — ENOXAPARIN SODIUM 100 MG/ML
40 INJECTION SUBCUTANEOUS
Status: COMPLETED | OUTPATIENT
Start: 2021-11-12 | End: 2021-11-12

## 2021-11-12 RX ORDER — LIDOCAINE HYDROCHLORIDE 10 MG/ML
0.1 INJECTION, SOLUTION EPIDURAL; INFILTRATION; INTRACAUDAL; PERINEURAL AS NEEDED
Status: DISCONTINUED | OUTPATIENT
Start: 2021-11-12 | End: 2021-11-12 | Stop reason: HOSPADM

## 2021-11-12 RX ORDER — FENTANYL CITRATE 50 UG/ML
25 INJECTION, SOLUTION INTRAMUSCULAR; INTRAVENOUS
Status: DISCONTINUED | OUTPATIENT
Start: 2021-11-12 | End: 2021-11-12 | Stop reason: HOSPADM

## 2021-11-12 RX ORDER — KETAMINE HYDROCHLORIDE 10 MG/ML
INJECTION, SOLUTION INTRAMUSCULAR; INTRAVENOUS AS NEEDED
Status: DISCONTINUED | OUTPATIENT
Start: 2021-11-12 | End: 2021-11-12 | Stop reason: HOSPADM

## 2021-11-12 RX ORDER — ACETAMINOPHEN 500 MG
1000 TABLET ORAL ONCE
Status: COMPLETED | OUTPATIENT
Start: 2021-11-12 | End: 2021-11-12

## 2021-11-12 RX ORDER — SODIUM CHLORIDE, SODIUM LACTATE, POTASSIUM CHLORIDE, CALCIUM CHLORIDE 600; 310; 30; 20 MG/100ML; MG/100ML; MG/100ML; MG/100ML
50 INJECTION, SOLUTION INTRAVENOUS CONTINUOUS
Status: DISCONTINUED | OUTPATIENT
Start: 2021-11-12 | End: 2021-11-12 | Stop reason: HOSPADM

## 2021-11-12 RX ORDER — PROPOFOL 10 MG/ML
INJECTION, EMULSION INTRAVENOUS AS NEEDED
Status: DISCONTINUED | OUTPATIENT
Start: 2021-11-12 | End: 2021-11-12 | Stop reason: HOSPADM

## 2021-11-12 RX ORDER — HYDROMORPHONE HYDROCHLORIDE 1 MG/ML
0.2 INJECTION, SOLUTION INTRAMUSCULAR; INTRAVENOUS; SUBCUTANEOUS
Status: DISCONTINUED | OUTPATIENT
Start: 2021-11-12 | End: 2021-11-12 | Stop reason: HOSPADM

## 2021-11-12 RX ORDER — MORPHINE SULFATE 2 MG/ML
2 INJECTION, SOLUTION INTRAMUSCULAR; INTRAVENOUS
Status: DISCONTINUED | OUTPATIENT
Start: 2021-11-12 | End: 2021-11-12 | Stop reason: HOSPADM

## 2021-11-12 RX ORDER — ONDANSETRON 2 MG/ML
4 INJECTION INTRAMUSCULAR; INTRAVENOUS AS NEEDED
Status: DISCONTINUED | OUTPATIENT
Start: 2021-11-12 | End: 2021-11-12 | Stop reason: HOSPADM

## 2021-11-12 RX ORDER — DEXAMETHASONE SODIUM PHOSPHATE 4 MG/ML
INJECTION, SOLUTION INTRA-ARTICULAR; INTRALESIONAL; INTRAMUSCULAR; INTRAVENOUS; SOFT TISSUE AS NEEDED
Status: DISCONTINUED | OUTPATIENT
Start: 2021-11-12 | End: 2021-11-12 | Stop reason: HOSPADM

## 2021-11-12 RX ORDER — MIDAZOLAM HYDROCHLORIDE 1 MG/ML
0.5 INJECTION, SOLUTION INTRAMUSCULAR; INTRAVENOUS
Status: DISCONTINUED | OUTPATIENT
Start: 2021-11-12 | End: 2021-11-12 | Stop reason: HOSPADM

## 2021-11-12 RX ADMIN — PROPOFOL 180 MG: 10 INJECTION, EMULSION INTRAVENOUS at 07:34

## 2021-11-12 RX ADMIN — KETAMINE HYDROCHLORIDE 20 MG: 10 INJECTION, SOLUTION INTRAMUSCULAR; INTRAVENOUS at 07:55

## 2021-11-12 RX ADMIN — ACETAMINOPHEN 1000 MG: 500 TABLET ORAL at 06:40

## 2021-11-12 RX ADMIN — FENTANYL CITRATE 25 MCG: 50 INJECTION, SOLUTION INTRAMUSCULAR; INTRAVENOUS at 13:09

## 2021-11-12 RX ADMIN — ROCURONIUM BROMIDE 10 MG: 10 SOLUTION INTRAVENOUS at 10:21

## 2021-11-12 RX ADMIN — SUGAMMADEX 200 MG: 100 INJECTION, SOLUTION INTRAVENOUS at 12:26

## 2021-11-12 RX ADMIN — SUCCINYLCHOLINE CHLORIDE 160 MG: 20 INJECTION, SOLUTION INTRAMUSCULAR; INTRAVENOUS at 07:34

## 2021-11-12 RX ADMIN — HYDROMORPHONE HYDROCHLORIDE 0.5 MG: 2 INJECTION, SOLUTION INTRAMUSCULAR; INTRAVENOUS; SUBCUTANEOUS at 12:25

## 2021-11-12 RX ADMIN — FENTANYL CITRATE 50 MCG: 50 INJECTION, SOLUTION INTRAMUSCULAR; INTRAVENOUS at 07:34

## 2021-11-12 RX ADMIN — DEXMEDETOMIDINE HYDROCHLORIDE 8 MCG: 100 INJECTION, SOLUTION, CONCENTRATE INTRAVENOUS at 08:00

## 2021-11-12 RX ADMIN — DEXMEDETOMIDINE HYDROCHLORIDE 8 MCG: 100 INJECTION, SOLUTION, CONCENTRATE INTRAVENOUS at 07:26

## 2021-11-12 RX ADMIN — ROCURONIUM BROMIDE 45 MG: 10 SOLUTION INTRAVENOUS at 07:49

## 2021-11-12 RX ADMIN — HYDROMORPHONE HYDROCHLORIDE 0.5 MG: 2 INJECTION, SOLUTION INTRAMUSCULAR; INTRAVENOUS; SUBCUTANEOUS at 11:56

## 2021-11-12 RX ADMIN — ROCURONIUM BROMIDE 5 MG: 10 SOLUTION INTRAVENOUS at 07:34

## 2021-11-12 RX ADMIN — Medication 80 MCG: at 09:12

## 2021-11-12 RX ADMIN — PROPOFOL 50 MG: 10 INJECTION, EMULSION INTRAVENOUS at 07:44

## 2021-11-12 RX ADMIN — LIDOCAINE HYDROCHLORIDE 60 MG: 20 INJECTION, SOLUTION EPIDURAL; INFILTRATION; INTRACAUDAL; PERINEURAL at 07:34

## 2021-11-12 RX ADMIN — ROCURONIUM BROMIDE 10 MG: 10 SOLUTION INTRAVENOUS at 11:46

## 2021-11-12 RX ADMIN — MIDAZOLAM 4 MG: 1 INJECTION INTRAMUSCULAR; INTRAVENOUS at 07:26

## 2021-11-12 RX ADMIN — ROCURONIUM BROMIDE 20 MG: 10 SOLUTION INTRAVENOUS at 08:46

## 2021-11-12 RX ADMIN — DEXMEDETOMIDINE HYDROCHLORIDE 4 MCG: 100 INJECTION, SOLUTION, CONCENTRATE INTRAVENOUS at 08:39

## 2021-11-12 RX ADMIN — DEXAMETHASONE SODIUM PHOSPHATE 8 MG: 4 INJECTION, SOLUTION INTRAMUSCULAR; INTRAVENOUS at 07:45

## 2021-11-12 RX ADMIN — SODIUM CHLORIDE, POTASSIUM CHLORIDE, SODIUM LACTATE AND CALCIUM CHLORIDE: 600; 310; 30; 20 INJECTION, SOLUTION INTRAVENOUS at 09:30

## 2021-11-12 RX ADMIN — HYDROMORPHONE HYDROCHLORIDE 0.5 MG: 2 INJECTION, SOLUTION INTRAMUSCULAR; INTRAVENOUS; SUBCUTANEOUS at 12:42

## 2021-11-12 RX ADMIN — ROCURONIUM BROMIDE 20 MG: 10 SOLUTION INTRAVENOUS at 10:47

## 2021-11-12 RX ADMIN — ROCURONIUM BROMIDE 20 MG: 10 SOLUTION INTRAVENOUS at 09:46

## 2021-11-12 RX ADMIN — WATER 2 G: 1 INJECTION INTRAMUSCULAR; INTRAVENOUS; SUBCUTANEOUS at 11:55

## 2021-11-12 RX ADMIN — FENTANYL CITRATE 50 MCG: 50 INJECTION, SOLUTION INTRAMUSCULAR; INTRAVENOUS at 09:09

## 2021-11-12 RX ADMIN — SODIUM CHLORIDE, POTASSIUM CHLORIDE, SODIUM LACTATE AND CALCIUM CHLORIDE 50 ML/HR: 600; 310; 30; 20 INJECTION, SOLUTION INTRAVENOUS at 06:58

## 2021-11-12 RX ADMIN — SODIUM CHLORIDE, POTASSIUM CHLORIDE, SODIUM LACTATE AND CALCIUM CHLORIDE: 600; 310; 30; 20 INJECTION, SOLUTION INTRAVENOUS at 06:50

## 2021-11-12 RX ADMIN — WATER 2 G: 1 INJECTION INTRAMUSCULAR; INTRAVENOUS; SUBCUTANEOUS at 07:51

## 2021-11-12 RX ADMIN — KETAMINE HYDROCHLORIDE 30 MG: 10 INJECTION, SOLUTION INTRAMUSCULAR; INTRAVENOUS at 07:34

## 2021-11-12 RX ADMIN — HYDROMORPHONE HYDROCHLORIDE 0.5 MG: 2 INJECTION, SOLUTION INTRAMUSCULAR; INTRAVENOUS; SUBCUTANEOUS at 10:21

## 2021-11-12 RX ADMIN — ONDANSETRON HYDROCHLORIDE 4 MG: 2 INJECTION, SOLUTION INTRAMUSCULAR; INTRAVENOUS at 12:26

## 2021-11-12 RX ADMIN — PROPOFOL 50 MG: 10 INJECTION, EMULSION INTRAVENOUS at 07:46

## 2021-11-12 RX ADMIN — ENOXAPARIN SODIUM 40 MG: 100 INJECTION SUBCUTANEOUS at 13:35

## 2021-11-12 RX ADMIN — FENTANYL CITRATE 25 MCG: 50 INJECTION, SOLUTION INTRAMUSCULAR; INTRAVENOUS at 12:55

## 2021-11-12 RX ADMIN — SODIUM CHLORIDE, POTASSIUM CHLORIDE, SODIUM LACTATE AND CALCIUM CHLORIDE: 600; 310; 30; 20 INJECTION, SOLUTION INTRAVENOUS at 11:30

## 2021-11-12 RX ADMIN — DIAZEPAM 5 MG: 5 INJECTION, SOLUTION INTRAMUSCULAR; INTRAVENOUS at 13:28

## 2021-11-12 RX ADMIN — ONDANSETRON HYDROCHLORIDE 4 MG: 2 INJECTION, SOLUTION INTRAMUSCULAR; INTRAVENOUS at 07:45

## 2021-11-12 NOTE — DISCHARGE INSTRUCTIONS
Leave the surgical garment and dressings ON and DRY for 48 hours then may remove for shower. Expect and anticipate bloody watery drainage from the liposuction incisions on your posterior flanks and the thighs for a few days so use plastic trash bags and towels to protect furniture and bedding. Resume any important pre op medications and diet BUT use sport drinks like gatorade or power aid instead of water for 2-3 days and extra protein in diet helps wounds heal.  Keep head elevated at night when sleeping about 30 degrees, do not lay flat for about 2 weeks  Walk every 1-2 hours during the day in house while awake for 10 minutes during the first 2 weeks  Use the provided incentive spirometer 4-5 times each hour while awake during the first 2 weeks  Use the provided BIBI hose on your lower legs at all times except when showering, for the first 2 weeks  Use stool softeners to prevent constipation  Avoid food/beverages that cause gas or distention for a few weeks, and you may use things like Gas X, Tums, or Rolaids to treat symptoms of reflux or indigestion  Use Probiotics (either pills or in yogurt) to help with GI regularity  LENIN drains (2) measure and record daily output, recharge as needed, strip tubes 2-3 times each day  You bill given a dose of Lovenox which is a blood thinner in the recovery room, but please start a baby aspirin 81 mg daily beginning on Sunday, Nov. 14 and continue for 2 weeks. When you shower, remove the surgical garment. Pay attention to the white foam that is on your posterior flanks and the medial thighs, remove and save it, discard any lose gauze, suspend the drain bulbs around your neck with a lanyard or piece of string/yarn, shower like you normally would, place antibiotic ointment of choice over all incisions with clean gauze, replace the white foam over your flanks and the medial thighs like you found it, and place the second binder/garment ON as you found the first one.  You may now repeat daily or every other day based on your desire, and launder the garments in between use. After about 10 days you may discard the white foam and only use the compression garment. Be sure to bring the drain tubes out the bottom of the binder panels and not the top edge. Call DR Nasario Rubinstein at [phone] (cell) for questions  Anticipate a phone call from DR. Nasario Rubinstein [facility]        Patient Education   Scopolamine (Absorbed through the skin)   Scopolamine (uoob-QGN-y-meen)  Treat nausea and vomiting. Brand Name(s): Transderm Scop   There may be other brand names for this medicine. When This Medicine Should Not Be Used: You should not use this medicine if you have had an allergic reaction to scopolamine, or if you have narrow angle glaucoma. How to Use This Medicine:   Patch  · Your doctor will tell you how many patches to use, where to apply them, and how often to apply them. Do not use more patches or apply them more often than your doctor tells you to. · Read and follow the patient instructions that come with this medicine. Talk to your doctor or pharmacist if you have any questions. · To prevent motion sickness, apply the patch at least 4 hours before you need it. · Wash and dry your hands thoroughly before applying the patch. · Leave the patch in its sealed wrapper until you are ready to put it on. Tear the wrapper open carefully. NEVER CUT the wrapper or the patch with scissors. Do not use any patch that has been cut by accident. · Take the liner off the sticky side before applying. · Apply the patch to dry, hairless skin behind the ear. · If the patch is loose or falls off, apply a new patch at a different place behind the ear. · After you take off the patch, wash the place where the patch was and your hands thoroughly. · Only one patch should be used at any time. If a dose is missed:   · If you forget to wear or change a patch, put one on as soon as you can.  If it is almost time to put on your next patch, wait until then to apply a new patch and skip the one you missed. Do not apply extra patches to make up for a missed dose. How to Store and Dispose of This Medicine:   · Store the patches at room temperature in a closed container, away from heat, moisture, and direct light. · Fold the used patch in half with the sticky sides together. Throw any used patch away so that children or pets cannot get to it. You will also need to throw away old patches after the expiration date has passed. · Keep all medicine out of the reach of children. Never share your medicine with anyone. Drugs and Foods to Avoid:   Ask your doctor or pharmacist before using any other medicine, including over-the-counter medicines, vitamins, and herbal products. · Tell your doctor if you use anything else that makes you sleepy. Some examples are allergy medicine, narcotic pain medicine, and alcohol. · Do not drink alcohol while you are using this medicine. Warnings While Using This Medicine:   · Make sure your doctor knows if you are pregnant or breastfeeding, or if you have glaucoma, prostate problems, trouble urinating, blocked bowels, liver disease, kidney disease, or a history of seizures or mental illness. · This medicine can cause blurring of vision and other vision problems if it comes in contact with the eyes. This medicine may also cause problems with urination. If any of these reactions occur, remove the patch and call your doctor right away. · This medicine may make you dizzy or drowsy. Avoid driving, using machines, or doing anything else that could be dangerous if you are not alert. If you plan to participate in underwater sports, this medicine may cause disorienting effects. If this is a concern for you, talk with your doctor. · This medicine may make you sweat less and cause your body to get too hot. Be careful in hot weather, when you are exercising, or if using a sauna or whirlpool.   · Tell any doctor or dentist who treats you that you are using this medicine. This medicine may affect certain medical test results. · Skin burns have been reported at the patch site in several patients wearing an aluminized transdermal system during a magnetic resonance imaging scan (MRI). Because Transderm Sc?p® contains aluminum, it is recommended to remove the system before undergoing an MRI. Possible Side Effects While Using This Medicine:   Call your doctor right away if you notice any of these side effects:  · Allergic reaction: Itching or hives, swelling in your face or hands, swelling or tingling in your mouth or throat, chest tightness, trouble breathing  · Blurred vision. · Confusion or memory loss. · Fast, slow, or uneven heartbeat. · Lightheadedness, dizziness, drowsiness, or fainting. · Seeing, hearing, or feeling things that are not there. · Severe eye pain. · Trouble urinating. If you notice these less serious side effects, talk with your doctor:   · Dry mouth. · Dry, itchy, or red eyes. · Restlessness. · Skin rash or redness. If you notice other side effects that you think are caused by this medicine, tell your doctor. Call your doctor for medical advice about side effects. You may report side effects to FDA at 6-238-FDA-6827  © 2017 [facility] Information is for End User's use only and may not be sold, redistributed or otherwise used for commercial purposes. The above information is an  only. It is not intended as medical advice for individual conditions or treatments. Talk to your doctor, nurse or pharmacist before following any medical regimen to see if it is safe and effective for you. Patient Education   Bupivacaine Liposome (By injection)   Bupivacaine Liposome (stx-BQW-t-singer LYE-poh-some)  Relieves pain after surgery. This medicine is a local anesthetic. Brand Name(s): Exparel   There may be other brand names for this medicine.   When This Medicine Should Not Be Used: This medicine is not right for everyone. Do not use it if you had an allergic reaction to bupivacaine. How to Use This Medicine:   Injectable  · A nurse or other trained health professional will give you this medicine in a hospital. This medicine is given through a needle injected into the surgical site. Drugs and Foods to Avoid:   Ask your doctor or pharmacist before using any other medicine, including over-the-counter medicines, vitamins, and herbal products. · Tell your doctor if you are also using other numbing medicines, including other kinds of bupivacaine, such as lidocaine. You should not be given any other kind of bupivacaine for at least 4 days. Warnings While Using This Medicine:   · Tell your doctor if you are pregnant or breastfeeding, or if you have kidney disease or liver disease. · This medicine may make you dizzy or drowsy. Do not drive or do anything that could be dangerous until you know how this medicine affects you. · This medicine should cause numbness only to the area where it is injected. It is not meant to cause you to fall asleep or become unconscious. · It may be easier to hurt yourself while your treated body area is still numb. Be careful to avoid injury until you have regained all the feeling and are no longer numb.   Possible Side Effects While Using This Medicine:   Call your doctor right away if you notice any of these side effects:  · Allergic reaction: Itching or hives, swelling in your face or hands, swelling or tingling in your mouth or throat, chest tightness, trouble breathing  · Anxiety, depression, restlessness, drowsiness, ringing in your ears, blurred vision  · Chest pain, fast, pounding, slow, or uneven heartbeat, trouble breathing  · Lightheadedness, dizziness, fainting  · Nausea, vomiting, chills, metallic taste in your mouth  · Seizures, shivering, shaking, or tremors  If you notice these less serious side effects, talk with your doctor:   · Headache, back pain  · Trouble sleeping  If you notice other side effects that you think are caused by this medicine, tell your doctor. Call your doctor for medical advice about side effects. You may report side effects to FDA at 0-517-ADH-5970  © 2017 [facility] Information is for End User's use only and may not be sold, redistributed or otherwise used for commercial purposes. The above information is an  only. It is not intended as medical advice for individual conditions or treatments. Talk to your doctor, nurse or pharmacist before following any medical regimen to see if it is safe and effective for you. Patient Education        Surgical Drain Care: Care Instructions  Your Care Instructions     After a surgery, fluid may collect inside your body in the surgical area. This makes an infection or other problems more likely. A surgical drain allows the fluid to flow out. The doctor puts a thin, flexible rubber tube into the area of your body where the fluid is likely to collect. The rubber tube carries the fluid outside your body. The most common type of surgical drain carries the fluid into a collection bulb that you empty. This is called a Virgil-Power (LENIN) drain. The drain uses suction created by the bulb to pull the fluid from your body into the bulb. The rubber tube will probably be held in place by one or two stitches in your skin. The bulb will probably be attached with a safety pin to your clothes or near the bandage so that it doesn't flip around or pull on the stitches. Another type of drain is called a Penrose drain. This type of drain doesn't have a bulb. Instead, the end of the tube is open. That allows the fluid to drain onto a dressing taped to your skin. The drain may be kept in place next to your skin with a stitch or a safety pin in the tube. When you first get the drain, the fluid will be bloody.  It will change color from red to pink to a light yellow or clear as the wound heals and the fluid starts to go away. Your doctor may give you information on when you no longer need the drain and when it will be removed. Follow-up care is a key part of your treatment and safety. Be sure to make and go to all appointments, and call your doctor if you are having problems. It's also a good idea to know your test results and keep a list of the medicines you take. How do you empty the bulb of a Virgil-Power drain? Follow any instructions your doctor gives you. How often you empty the bulb depends on how much fluid is draining. Empty the bulb when it is half full. 1. Wash your hands with soap and water. 2. Take the plug out of the bulb. 3. Empty the bulb. If your doctor asks you to measure the fluid, empty the fluid into a measuring cup, and write down the color and how much you collected. Your doctor will want to know this information. 4. Clean the plug with alcohol. 5. Squeeze the bulb until it is flat. This removes all the air from the bulb. You may need to put the bulb on a table or a counter to flatten it. 6. Keep the bulb flat, and put the plug in. The bulb should stay flat after you put the plug back in. This creates the suction that pulls the fluid into the bulb. 7. Empty the fluid into the toilet. 8. Wash your hands. How do you change the dressing around your surgical drain? You may have a dressing (bandage). The dressing is often made of gauze pads held on with tape. Your doctor will tell you how often to change it. 1. Wash your hands with soap and water. 2. Take off the dressing from around the drain. 3. Clean the drain site and the skin around it with soap and water. Use gauze or a cotton swab. 4. When the site is dry, put on a new dressing. The way your dressing is put on depends on what kind of drain you have. You will get instructions for your type of drain. 5. Wash your hands again with soap and water.   Your doctor may ask you to keep track of your dressing changes. Write down the time of day and the amount and color of the fluid on the dressing. How do you help prevent clogs in your surgical drain? Squeezing or \"milking\" the tube of your surgical drain can help prevent clogs so that it drains correctly. Your doctor will tell you when you need to do this. In general, you do this when:  · You see a clot in the tube that prevents fluid from draining. The clot may look like a dark, stringy lining. · You see fluid leaking around the tube where it goes into the skin. Follow these steps for milking the tube. 1. Use one hand to hold and pinch the tube where it leaves the skin. 2. With the thumb and first finger of your other hand, pinch the tube just below where you're holding it. 3. Slowly and firmly push your thumb and first finger down the tubing toward the end of the tube. 4. Repeat this as many times as needed to move the clot. If you have a Virgil-Power (LENIN) drain, the clot should move down the tube and into the bulb. If you have a Penrose drain, the clot should move into the dressing. When should you call for help? Call your doctor now or seek immediate medical care if:    · You have signs of infection, such as:  ? Increased pain, swelling, warmth, or redness around the area. ? Red streaks leading from the area. ? Pus draining from the area. ? A fever.     · You see a sudden change in the color or smell of the drainage.     · The tube is coming loose where it leaves your skin. Watch closely for changes in your health, and be sure to contact your doctor if:    · You see a lot of fluid around the drain.     · You cannot remove a clot from the tube by milking the tube. Where can you learn more? Go to http://www.gray.com/  Enter K117 in the search box to learn more about \"Surgical Drain Care: Care Instructions. \"  Current as of: July 1, 2021               Content Version: 13.0  © 8498-3063 [employer]. Care instructions adapted under license by [employer] (which disclaims liability or warranty for this information). If you have questions about a medical condition or this instruction, always ask your healthcare professional. [address] any warranty or liability for your use of this information.

## 2021-11-12 NOTE — BRIEF OP NOTE
Brief Postoperative Note    Patient: Corie Burnett  YOB: 1980  MRN: [id number]    Date of Procedure: 11/12/2021     Pre-Op Diagnosis: COSMETIC    Post-Op Diagnosis: Same as preoperative diagnosis. Procedure(s):  ABDOMINOPLASTY, LIPOSUCTION  BILATERAL MEDIAL THIGHS, EXCISION LIPECTOMY OF BILATERAL INNER THIGHS  LIPOSUCTION BILATERAL FLANKS    Surgeon(s):  Ridge Long MD    Surgical Assistant: Surg Asst-1: Benito ESPINAL    Anesthesia: General     Estimated Blood Loss (mL): less than 541     Complications: None    Specimens: * No specimens in log *     Implants: * No implants in log *    Drains:   Drain 15f CHANNEL ROUND DRAIN 11/12/21 Left Abdomen (Active)   Site Assessment Clean, dry, & intact 11/12/21 1244   Dressing Status Clean, dry, & intact 11/12/21 1244   Status Patent; Charged; Draining 11/12/21 1244   Drainage Description Serosanguinous 11/12/21 1244       Drain 15 F CHANNEL ROUND DRAIN 11/12/21 Right Abdomen (Active)   Site Assessment Clean, dry, & intact 11/12/21 1244   Dressing Status Clean, dry, & intact 11/12/21 1244   Status Patent;  Charged 11/12/21 1244   Drainage Description Serosanguinous 11/12/21 1244       Findings: normal for age and history    Electronically Signed by Johnny Tafoya MD on 11/12/2021 at 12:52 PM

## 2021-11-12 NOTE — PERIOP NOTES
I have reviewed discharge instructions with the patient and spouse. The patient and spouse verbalized understanding. All questions addressed at this time. A paper copy of these instructions have been given to the patient to take home.   Drain teaching /KIRAN[facility] completed

## 2021-11-12 NOTE — PERIOP NOTES
1350:  Patient very uncomfortable and stressed, received 2 doses Fentanyl with minimal relief. Called anesthesia and Dr Sunny Andujar ordered Valium 5 mg. Within 10 min, resting comfortably.

## 2021-11-12 NOTE — ROUTINE PROCESS
Patient: Blanche Day MRN: [id number]  SSN: [id number]   YOB: 1980  Age: 35 y.o. Sex: female     Patient is status post Procedure(s) with comments:  ABDOMINOPLASTY, LIPOSUCTION  BILATERAL MEDIAL THIGHS, EXCISION LIPECTOMY OF BILATERAL INNER THIGHS - AND MEDIAL THIGHS  LIPOSUCTION BILATERAL FLANKS - FLANKS. Surgeon(s) and Role:     * Radha Leno MD - Primary    Local/Dose/Irrigation:                    Peripheral IV 11/12/21 Left; Posterior Hand (Active)          Drain 15f CHANNEL ROUND DRAIN 11/12/21 Left Abdomen (Active)   Site Assessment Clean, dry, & intact 11/12/21 1244   Dressing Status Clean, dry, & intact 11/12/21 1244   Status Patent; Charged; Draining 11/12/21 1244   Drainage Description Serosanguinous 11/12/21 1244       Drain 15 F CHANNEL ROUND DRAIN 11/12/21 Right Abdomen (Active)   Site Assessment Clean, dry, & intact 11/12/21 1244   Dressing Status Clean, dry, & intact 11/12/21 1244   Status Patent;  Charged 11/12/21 1244   Drainage Description Serosanguinous 11/12/21 1244                     Dressing/Packing:  Incision 11/12/21 Abdomen-Dressing/Treatment:  (XEROFORM, 4X4, TAPE, COMPRESSION GARMENT) (11/12/21 1100)  Incision 11/12/21 Back-Dressing/Treatment:  (TOPIFOAM, TAPE) (11/12/21 1100)  Incision 11/12/21 Thigh-Dressing/Treatment:  (XEROFORM, 4X4, TAPE.) (11/12/21 1100)    Splint/Cast:  ]    Other:

## 2021-11-12 NOTE — ANESTHESIA POSTPROCEDURE EVALUATION
Post-Anesthesia Evaluation and Assessment    Patient: Soila Ramos MRN: [id number]  SSN: [id number]    YOB: 1980  Age: 35 y.o. Sex: female      I have evaluated the patient and they are stable and ready for discharge from the PACU. Cardiovascular Function/Vital Signs  Visit Vitals  /77   Pulse 96   Temp 37.2 °C (98.9 °F)   Resp 16   Wt 105.7 kg (233 lb)   SpO2 98%   BMI 34.41 kg/m²       Patient is status post General anesthesia for Procedure(s) with comments:  ABDOMINOPLASTY, LIPOSUCTION  BILATERAL MEDIAL THIGHS, EXCISION LIPECTOMY OF BILATERAL INNER THIGHS - AND MEDIAL THIGHS  LIPOSUCTION BILATERAL FLANKS - FLANKS. Nausea/Vomiting: None    Postoperative hydration reviewed and adequate. Pain:      Managed    Neurological Status: At baseline    Mental Status, Level of Consciousness: Alert and  oriented to person, place, and time    Pulmonary Status:   O2 Device: CO2 nasal cannula (11/12/21 1250)   Adequate oxygenation and airway patent    Complications related to anesthesia: None    Post-anesthesia assessment completed. No concerns    Signed By: Carlos Rand MD     November 12, 2021              Procedure(s):  ABDOMINOPLASTY, LIPOSUCTION  BILATERAL MEDIAL THIGHS, EXCISION LIPECTOMY OF BILATERAL INNER THIGHS  LIPOSUCTION BILATERAL FLANKS.     general    <[email or web address]>    INITIAL Post-op Vital signs:   Vitals Value Taken Time   /77 11/12/21 1250   Temp 37.2 °C (98.9 °F) 11/12/21 1250   Pulse 96 11/12/21 1250   Resp 16 11/12/21 1250   SpO2 98 % 11/12/21 1250

## 2021-11-12 NOTE — INTERVAL H&P NOTE
Update History & Physical    The Patient's History and Physical of November 4, 2021 was reviewed with the patient and I examined the patient. There was no change. The surgical site was confirmed by the patient and me. Plan:  The risk, benefits, expected outcome, and alternative to the recommended procedure have been discussed with the patient. Patient understands and wants to proceed with the procedure.     Electronically signed by Vineet Moon MD on 11/12/2021 at 6:53 AM

## 2021-11-13 NOTE — OP NOTES
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 PROCEDURES PERFORMED:  Extended abdominoplasty of anterior trunk with muscle fascia plication, suction-assisted lipectomy, liposuction of bilateral posterior flank or love handle region and bilateral medial thighs with small skin excision of medial thigh. SURGEON:  Chico Merino MD    ASSISTANT:  Samanta Bowers. STAFF:  OR staff, room #4, [facility], 7th floor. ANESTHESIA:  General.    COMPLICATIONS:  None. SPECIMENS REMOVED:  Fat aspiration, right posterolateral and anterolateral flank 2000 mL, left posterolateral and anterolateral flank 2100 mL, right medial thigh 600 mL, left medial thigh 600 mL. Skin and fat, anterior abdominal wall 3358 g discarded. IMPLANTS:  None. ESTIMATED BLOOD LOSS:  Approximately 100 mL. IV FLUIDS:  Approximately 2400 mL. DRAINS:  15-Syriac round fluted drains x2 anterior abdominal wall, previously described. TOTAL TUMESCENT FLUID INJECTED:  4000 mL. FINDINGS:  Normal for age and history. INDICATIONS FOR PROCEDURE:  The patient is a pleasant 67-year-old female who was seen in the plastic surgery clinic with a desire to undergo elective body contouring surgery. She is an otherwise healthy woman who has struggled with her weight and over her efforts at diet and exercise, she was able to loose approximately 50 pounds over the last 1-2 years.   However, she has also seen soft tissue laxity and fullness in her anterior abdominal wall with an overhanging fold of pannus and unwanted lipodystrophy following the birth of 3 children, all by , ages 6, 13, and 21. She was felt to be an acceptable candidate to treat these areas of unwanted fat and poor tissue tone with an extended abdominoplasty anteriorly with muscle fascia plication, liposuction to the bilateral posterolateral and anterolateral flank or love handle region and the medial thighs bilaterally, and comes in today for these multiple elective procedures. PROCEDURE:  After appropriate consent was obtained, preoperative markings were placed. She was taken to the operating room and placed on the operating table in supine position. Satisfactory general endotracheal anesthesia was obtained. SCD devices were placed per routine. A Monzon catheter was also placed to help facilitate intraoperative fluid management and she was then rotated to the prone position with appropriate padding and support. Her posterior trunk was sterilely prepped and draped. Access incisions were made on either side of the midline with a #11 scalpel blade and standard Klein tumescent fluid times approximately 1000 mL was placed into each posterolateral flank or love handle region. After allowing the tumescent fluid to sit approximately 7 minutes, suction-assisted lipectomy or liposuction was performed with a #6 or #5 Mercedes tip cannula. Total volumes of fat aspiration will be dictated at the end of the note under specimens. Once we were satisfied with liposuction, the skin was cleaned, the drapes were removed, pieces of TopiFoam were cut and taped to the posterior flank and then she was rotated back to a transfer stretcher in the supine position back to the operative table in supine position with arms externally rotated, abduction on padded armboards.   The anterior trunk including the anteromedial and anterolateral thighs were then sterilely prepped and draped. A small frog leg position was placed to the medial thigh to help facilitate exposure. After the anterior trunk and the thighs were sterilely prepped and draped, additional access incisions were made near the medial thigh and 700 mL of tumescent fluid was injected into each medial thigh and an additional 300 mL into the anterolateral flank from the anterior approach. Additional suction-assisted lipectomy was performed also on each medial thigh with the same 5 or 6 Mercedes tip cannula. Some additional touch-up feathering liposuction from the anterior approach to the anterolateral flank or love handle region was also performed with the same cannula from the anterior approach. Due to the skin tone and the laxity created following liposuction in medial thighs, an incision was made along the medial inguinal crease on each side that measured approximately 15 cm in length and a small wedge of skin from the medial thigh was excised measuring approximately 4 cm in width and discarded. That excision wound was then closed in 2 layers with a 3-0 PDS as an interrupted buried stitch and then a 3-0 Monocryl in running subcuticular fashion. The legs were then pulled in the supine position and parallel on the operative table and we proceeded with the abdominoplasty making a circumumbilical and low transverse abdominal incision with a #10 scalpel blade based on our markings. The electrocautery was then used to dissect through the subcutaneous tissues to the rectus fascia. We then dissected in a cephalic direction to the xiphoid in the midline and the costal margins bilaterally. At this point, the anterior abdominal rectus fascia was then plicated with 0 Prolene suture in running fashion in 2 layers from the symphysis pubis to the umbilicus and 2 layers from the umbilicus to the xiphoid. The total width of plication on either side of the midline at the level of the umbilicus was about 9 cm.   At this point, 30 mL of 0.25% plain Marcaine were directly injected into the anterior rectus fascia to aid in postoperative pain management. The patient was then flexed at the waist to identify the amount of skin to be removed. It was marked with a marking pen, inspected for symmetry, incised with a #10 scalpel blade and removed with the electrocautery. At this point, Exparel long-acting Marcaine liposome 20 mL was diluted with an additional 60 mL of injectable saline. The entire 80 mL mixture was also directly injected into the anterior rectus fascia to aid in postoperative pain management. The abdominal wound was irrigated with Irrisept irrigation  solution and saline. 15-Rwandan round fluted drains were brought out laterally below the incision line and secured with a 3-0 silk suture. The anterior abdominal wound was then closed in 3 layers reapproximating Cecilia's fascia with a 2-0 PDS as an interrupted simple stitch, the dermis with a 3-0 Vicryl as a running inverted deep dermal stitch, and the skin with a 3-0 Monocryl in the running subcuticular fashion. The umbilicus was brought out through its new position and secured in 2 layers with 3-0 Vicryl and 4-0 Monocryl suture. Sterile dressings of Xeroform gauze, 4x4 gauze, ABD pads, and a combination liposuction girdle/abdominal binder garment was placed. At the end of multiple procedures, sponge and needle counts were reported as correct. She was awakened, extubated, and transferred to the recovery room in satisfactory and stable condition. She will be discharged home today in care of her family with prescriptions and instructions and will follow up with me within 1 week.         MD BEN [email or web address]  D:  11/12/2021 13:13  T:  11/12/2021 22:40  JOB #:  [id number]  CC:  Vineet Moon MD

## 2022-04-02 ASSESSMENT — VISUAL ACUITY
OD_CC: 20/20
OS_CC: 20/30
OS_CC: 20/30
OD_CC: 20/20

## 2022-04-02 ASSESSMENT — TONOMETRY: OD_IOP_MMHG: 12

## 2022-12-16 ENCOUNTER — ANESTHESIA EVENT (OUTPATIENT)
Dept: MEDSURG UNIT | Age: 42
End: 2022-12-16
Payer: SELF-PAY

## 2022-12-20 RX ORDER — PROGESTERONE 100 MG/1
200 CAPSULE ORAL EVERY EVENING
COMMUNITY

## 2022-12-20 RX ORDER — ESTRADIOL 0.03 MG/D
FILM, EXTENDED RELEASE TRANSDERMAL
COMMUNITY

## 2022-12-20 RX ORDER — TESTOSTERONE 20.25 MG/1.25G
1 GEL TOPICAL DAILY
COMMUNITY

## 2022-12-20 NOTE — H&P
Cristina Gandhi  : 1980  DOS: 2022    Chief Complaint: consultation       Allergies: No Known Drug Allergies (NKDA) , No Non-Medication Allergies (NNMA)       Medications: cephalexin 500 mg oral capsule , ondansetron 4 mg oral tablet , Percocet 5 mg-325 mg oral tablet , cephalexin 500 mg oral capsule       Medical History: Height: 5' 9\", Weight: 231 lbs    Pulmonary System: Negative  Cardiac System: Negative  Blood and Liver Systems: Negative  Neurologic and Endocrine Systems: Negative  GI,  and Reproductive Systems: Negative  Cancer: Negative   Surgical History: ETT lipo flanks lipo medial thighs ,  Hysterectomy  ,  C section       Family History: Breast Cancer Paternal Grandmother       Social History: Smoking Status  4 Never smoker    Pregnancy   3 Children          Ms. Isma Urbina is now 1 year postop from her elective body contouring surgery that included extended abdominoplasty, liposuction to her flanks, and liposuction to her thighs. She was last seen in May and returns today for an annual follow-up visit. We had discussed previously some additional liposuction and scar revision efforts. She has also given some thought to the posterior component of the circumferential abdominoplasty to address buttock laxity and tissue tone. She is not entirely committed to additional excision surgery but has questions regarding some recurrent laxity to the suprapubic mons region. Review of systems reveals normal heart and lung sounds. Examination demonstrates well-healed scars on her abdomen and an obvious marked improvement to the appearance and tone to the flank. She does have laxity and poor tissue tone on the buttocks and to my exam it seems as though she may have gained some weight over the last 6 months. She has a moderate amount of lipodystrophy of the upper lateral thigh, the anterior thigh, and the buttocks.     I had a lengthy discussion with Juan Bailon regarding body contouring with liposuction. Dang Manzanares understands this is performed under a general anesthetic and in most cases as an outpatient. We also discussed the tumescent technique and the differences between standard suction-assisted and ultrasonic-assisted liposuction. Patient understands there is an access incision made, tumescent fluid is injected into the areas of fat to be treated, ultrasonic technology is applied first with our [employer] 3000 generator, standard suction cannulas are then used to aspirate the emulsified liquefied fat, and the access incision is closed with a dissolvable suture, and compression garments with TopiFoam are placed and used for at least 6 weeks with associated activity restrictions. There is no exercise for 4 weeks. The risks and complications include but are not limited to infection, pain, bleeding, hematoma's requiring re-operation, skin sensitivity changes, vascular compromise to tissues resulting in partial or complete loss of tissues, resultant open wounds, the need for long term wound care and dressing changes and scarring, irregularities and asymmetries requiring touch-up and revision surgery, an unacceptable cosmetic result, and other things including cardiac and pulmonary risks that include death. Additional scar efforts can be made over the suprapubic region and the mons to excise additional skin and improve the tone in this region. Additional liposuction can be done also to the back and her flanks and her buttocks and her thighs. However skin tone will be negatively impacted, and my concern with just a suction lipectomy alone would be leaving her with a less than ideal skin tone and a cosmetic result. A set of annual pictures were taken today, she is going to give some thought to what it is she would like to achieve and how she would like to go about that and may get back to us. Otherwise we will see her on an as-needed basis.     The patient was counseled about the risks of eliana Covid-19 during their perioperative period and any recovery window from their procedure. The patient was made aware that eliana Covid-19 may worsen their prognosis for recovering from their procedure and lend to a higher morbidity and/or mortality risk. All material risks, benefits, and reasonable alternatives including postponing the procedure were discussed. The patient DOES wish to proceed with their procedure at this time. Nickolas Medrano M.D.  FACS

## 2022-12-21 ENCOUNTER — HOSPITAL ENCOUNTER (OUTPATIENT)
Age: 42
Setting detail: OUTPATIENT SURGERY
Discharge: HOME | End: 2022-12-21
Attending: SURGERY | Admitting: SURGERY
Payer: SELF-PAY

## 2022-12-21 ENCOUNTER — ANESTHESIA (OUTPATIENT)
Dept: MEDSURG UNIT | Age: 42
End: 2022-12-21
Payer: SELF-PAY

## 2022-12-21 VITALS
HEIGHT: 69 IN | TEMPERATURE: 97.6 F | WEIGHT: 220 LBS | OXYGEN SATURATION: 98 % | HEART RATE: 69 BPM | DIASTOLIC BLOOD PRESSURE: 81 MMHG | RESPIRATION RATE: 12 BRPM | SYSTOLIC BLOOD PRESSURE: 120 MMHG | BODY MASS INDEX: 32.58 KG/M2

## 2022-12-21 PROCEDURE — 2709999900 HC NON-CHARGEABLE SUPPLY: Performed by: SURGERY

## 2022-12-21 PROCEDURE — 2709999900 HC NON-CHARGEABLE SUPPLY

## 2022-12-21 PROCEDURE — 74011000250 HC RX REV CODE- 250: Performed by: SURGERY

## 2022-12-21 PROCEDURE — 76030000003 HC AMB SURG OR TIME 1.5 TO 2: Performed by: SURGERY

## 2022-12-21 PROCEDURE — 77030002986 HC SUT PROL J&J -A: Performed by: SURGERY

## 2022-12-21 PROCEDURE — 74011250636 HC RX REV CODE- 250/636: Performed by: NURSE ANESTHETIST, CERTIFIED REGISTERED

## 2022-12-21 PROCEDURE — 74011000250 HC RX REV CODE- 250: Performed by: NURSE ANESTHETIST, CERTIFIED REGISTERED

## 2022-12-21 PROCEDURE — 74011250636 HC RX REV CODE- 250/636: Performed by: SURGERY

## 2022-12-21 PROCEDURE — 77030002933 HC SUT MCRYL J&J -A: Performed by: SURGERY

## 2022-12-21 PROCEDURE — 77030031139 HC SUT VCRL2 J&J -A: Performed by: SURGERY

## 2022-12-21 PROCEDURE — 76060000063 HC AMB SURG ANES 1.5 TO 2 HR: Performed by: SURGERY

## 2022-12-21 PROCEDURE — 74011250636 HC RX REV CODE- 250/636: Performed by: ANESTHESIOLOGY

## 2022-12-21 PROCEDURE — 77030026438 HC STYL ET INTUB CARD -A: Performed by: ANESTHESIOLOGY

## 2022-12-21 PROCEDURE — 76210000035 HC AMBSU PH I REC 1 TO 1.5 HR: Performed by: SURGERY

## 2022-12-21 PROCEDURE — 77030008684 HC TU ET CUF COVD -B: Performed by: ANESTHESIOLOGY

## 2022-12-21 PROCEDURE — 77030040361 HC SLV COMPR DVT MDII -B: Performed by: SURGERY

## 2022-12-21 PROCEDURE — 77030040922 HC BLNKT HYPOTHRM STRY -A

## 2022-12-21 RX ORDER — FENTANYL CITRATE 50 UG/ML
25 INJECTION, SOLUTION INTRAMUSCULAR; INTRAVENOUS
Status: DISCONTINUED | OUTPATIENT
Start: 2022-12-21 | End: 2022-12-21 | Stop reason: HOSPADM

## 2022-12-21 RX ORDER — SODIUM CHLORIDE, SODIUM LACTATE, POTASSIUM CHLORIDE, CALCIUM CHLORIDE 600; 310; 30; 20 MG/100ML; MG/100ML; MG/100ML; MG/100ML
75 INJECTION, SOLUTION INTRAVENOUS CONTINUOUS
Status: DISCONTINUED | OUTPATIENT
Start: 2022-12-21 | End: 2022-12-21 | Stop reason: HOSPADM

## 2022-12-21 RX ORDER — DEXMEDETOMIDINE HYDROCHLORIDE 100 UG/ML
INJECTION, SOLUTION INTRAVENOUS AS NEEDED
Status: DISCONTINUED | OUTPATIENT
Start: 2022-12-21 | End: 2022-12-21 | Stop reason: HOSPADM

## 2022-12-21 RX ORDER — FENTANYL CITRATE 50 UG/ML
50 INJECTION, SOLUTION INTRAMUSCULAR; INTRAVENOUS AS NEEDED
Status: DISCONTINUED | OUTPATIENT
Start: 2022-12-21 | End: 2022-12-21 | Stop reason: HOSPADM

## 2022-12-21 RX ORDER — PROCHLORPERAZINE EDISYLATE 5 MG/ML
2.5 INJECTION INTRAMUSCULAR; INTRAVENOUS ONCE
Status: COMPLETED | OUTPATIENT
Start: 2022-12-21 | End: 2022-12-21

## 2022-12-21 RX ORDER — LIDOCAINE HYDROCHLORIDE 10 MG/ML
0.1 INJECTION, SOLUTION EPIDURAL; INFILTRATION; INTRACAUDAL; PERINEURAL AS NEEDED
Status: DISCONTINUED | OUTPATIENT
Start: 2022-12-21 | End: 2022-12-21 | Stop reason: HOSPADM

## 2022-12-21 RX ORDER — MIDAZOLAM HYDROCHLORIDE 1 MG/ML
1 INJECTION, SOLUTION INTRAMUSCULAR; INTRAVENOUS AS NEEDED
Status: DISCONTINUED | OUTPATIENT
Start: 2022-12-21 | End: 2022-12-21 | Stop reason: HOSPADM

## 2022-12-21 RX ORDER — ONDANSETRON 2 MG/ML
INJECTION INTRAMUSCULAR; INTRAVENOUS AS NEEDED
Status: DISCONTINUED | OUTPATIENT
Start: 2022-12-21 | End: 2022-12-21 | Stop reason: HOSPADM

## 2022-12-21 RX ORDER — SODIUM CHLORIDE 9 MG/ML
50 INJECTION, SOLUTION INTRAVENOUS CONTINUOUS
Status: DISCONTINUED | OUTPATIENT
Start: 2022-12-21 | End: 2022-12-21 | Stop reason: HOSPADM

## 2022-12-21 RX ORDER — SODIUM CHLORIDE 0.9 % (FLUSH) 0.9 %
5-40 SYRINGE (ML) INJECTION AS NEEDED
Status: DISCONTINUED | OUTPATIENT
Start: 2022-12-21 | End: 2022-12-21 | Stop reason: HOSPADM

## 2022-12-21 RX ORDER — NEOSTIGMINE METHYLSULFATE 1 MG/ML
INJECTION, SOLUTION INTRAVENOUS AS NEEDED
Status: DISCONTINUED | OUTPATIENT
Start: 2022-12-21 | End: 2022-12-21 | Stop reason: HOSPADM

## 2022-12-21 RX ORDER — ONDANSETRON 2 MG/ML
4 INJECTION INTRAMUSCULAR; INTRAVENOUS AS NEEDED
Status: DISCONTINUED | OUTPATIENT
Start: 2022-12-21 | End: 2022-12-21 | Stop reason: HOSPADM

## 2022-12-21 RX ORDER — GLYCOPYRROLATE 0.2 MG/ML
INJECTION INTRAMUSCULAR; INTRAVENOUS AS NEEDED
Status: DISCONTINUED | OUTPATIENT
Start: 2022-12-21 | End: 2022-12-21 | Stop reason: HOSPADM

## 2022-12-21 RX ORDER — PHENYLEPHRINE HCL IN 0.9% NACL 0.4MG/10ML
SYRINGE (ML) INTRAVENOUS AS NEEDED
Status: DISCONTINUED | OUTPATIENT
Start: 2022-12-21 | End: 2022-12-21 | Stop reason: HOSPADM

## 2022-12-21 RX ORDER — SODIUM CHLORIDE 0.9 % (FLUSH) 0.9 %
5-40 SYRINGE (ML) INJECTION EVERY 8 HOURS
Status: DISCONTINUED | OUTPATIENT
Start: 2022-12-21 | End: 2022-12-21 | Stop reason: HOSPADM

## 2022-12-21 RX ORDER — OXYCODONE AND ACETAMINOPHEN 5; 325 MG/1; MG/1
1 TABLET ORAL AS NEEDED
Status: DISCONTINUED | OUTPATIENT
Start: 2022-12-21 | End: 2022-12-21 | Stop reason: HOSPADM

## 2022-12-21 RX ORDER — PROPOFOL 10 MG/ML
INJECTION, EMULSION INTRAVENOUS AS NEEDED
Status: DISCONTINUED | OUTPATIENT
Start: 2022-12-21 | End: 2022-12-21 | Stop reason: HOSPADM

## 2022-12-21 RX ORDER — MORPHINE SULFATE 2 MG/ML
2 INJECTION, SOLUTION INTRAMUSCULAR; INTRAVENOUS
Status: DISCONTINUED | OUTPATIENT
Start: 2022-12-21 | End: 2022-12-21 | Stop reason: HOSPADM

## 2022-12-21 RX ORDER — HYDROMORPHONE HYDROCHLORIDE 1 MG/ML
0.2 INJECTION, SOLUTION INTRAMUSCULAR; INTRAVENOUS; SUBCUTANEOUS
Status: DISCONTINUED | OUTPATIENT
Start: 2022-12-21 | End: 2022-12-21 | Stop reason: HOSPADM

## 2022-12-21 RX ORDER — DIPHENHYDRAMINE HYDROCHLORIDE 50 MG/ML
12.5 INJECTION, SOLUTION INTRAMUSCULAR; INTRAVENOUS AS NEEDED
Status: DISCONTINUED | OUTPATIENT
Start: 2022-12-21 | End: 2022-12-21 | Stop reason: HOSPADM

## 2022-12-21 RX ORDER — MIDAZOLAM HYDROCHLORIDE 1 MG/ML
INJECTION, SOLUTION INTRAMUSCULAR; INTRAVENOUS AS NEEDED
Status: DISCONTINUED | OUTPATIENT
Start: 2022-12-21 | End: 2022-12-21 | Stop reason: HOSPADM

## 2022-12-21 RX ORDER — MIDAZOLAM HYDROCHLORIDE 1 MG/ML
0.5 INJECTION, SOLUTION INTRAMUSCULAR; INTRAVENOUS
Status: DISCONTINUED | OUTPATIENT
Start: 2022-12-21 | End: 2022-12-21 | Stop reason: HOSPADM

## 2022-12-21 RX ORDER — ROCURONIUM BROMIDE 10 MG/ML
INJECTION, SOLUTION INTRAVENOUS AS NEEDED
Status: DISCONTINUED | OUTPATIENT
Start: 2022-12-21 | End: 2022-12-21 | Stop reason: HOSPADM

## 2022-12-21 RX ORDER — LIDOCAINE HYDROCHLORIDE 20 MG/ML
INJECTION, SOLUTION EPIDURAL; INFILTRATION; INTRACAUDAL; PERINEURAL AS NEEDED
Status: DISCONTINUED | OUTPATIENT
Start: 2022-12-21 | End: 2022-12-21 | Stop reason: HOSPADM

## 2022-12-21 RX ORDER — SODIUM CHLORIDE, SODIUM LACTATE, POTASSIUM CHLORIDE, CALCIUM CHLORIDE 600; 310; 30; 20 MG/100ML; MG/100ML; MG/100ML; MG/100ML
INJECTION, SOLUTION INTRAVENOUS
Status: DISCONTINUED | OUTPATIENT
Start: 2022-12-21 | End: 2022-12-21 | Stop reason: HOSPADM

## 2022-12-21 RX ORDER — FENTANYL CITRATE 50 UG/ML
INJECTION, SOLUTION INTRAMUSCULAR; INTRAVENOUS AS NEEDED
Status: DISCONTINUED | OUTPATIENT
Start: 2022-12-21 | End: 2022-12-21 | Stop reason: HOSPADM

## 2022-12-21 RX ORDER — DEXAMETHASONE SODIUM PHOSPHATE 4 MG/ML
INJECTION, SOLUTION INTRA-ARTICULAR; INTRALESIONAL; INTRAMUSCULAR; INTRAVENOUS; SOFT TISSUE AS NEEDED
Status: DISCONTINUED | OUTPATIENT
Start: 2022-12-21 | End: 2022-12-21 | Stop reason: HOSPADM

## 2022-12-21 RX ADMIN — DEXAMETHASONE SODIUM PHOSPHATE 4 MG: 4 INJECTION, SOLUTION INTRAMUSCULAR; INTRAVENOUS at 14:16

## 2022-12-21 RX ADMIN — PROCHLORPERAZINE EDISYLATE 2.5 MG: 5 INJECTION INTRAMUSCULAR; INTRAVENOUS at 16:25

## 2022-12-21 RX ADMIN — ONDANSETRON HYDROCHLORIDE 4 MG: 2 INJECTION, SOLUTION INTRAMUSCULAR; INTRAVENOUS at 15:18

## 2022-12-21 RX ADMIN — ROCURONIUM BROMIDE 20 MG: 10 SOLUTION INTRAVENOUS at 14:54

## 2022-12-21 RX ADMIN — MIDAZOLAM HYDROCHLORIDE 2 MG: 1 INJECTION, SOLUTION INTRAMUSCULAR; INTRAVENOUS at 14:03

## 2022-12-21 RX ADMIN — MIDAZOLAM HYDROCHLORIDE 3 MG: 1 INJECTION, SOLUTION INTRAMUSCULAR; INTRAVENOUS at 14:02

## 2022-12-21 RX ADMIN — PROPOFOL 150 MG: 10 INJECTION, EMULSION INTRAVENOUS at 14:08

## 2022-12-21 RX ADMIN — DEXMEDETOMIDINE HYDROCHLORIDE 10 MCG: 100 INJECTION, SOLUTION, CONCENTRATE INTRAVENOUS at 14:55

## 2022-12-21 RX ADMIN — ONDANSETRON HYDROCHLORIDE 4 MG: 2 SOLUTION INTRAMUSCULAR; INTRAVENOUS at 15:53

## 2022-12-21 RX ADMIN — FENTANYL CITRATE 100 MCG: 50 INJECTION, SOLUTION INTRAMUSCULAR; INTRAVENOUS at 14:08

## 2022-12-21 RX ADMIN — SODIUM CHLORIDE, POTASSIUM CHLORIDE, SODIUM LACTATE AND CALCIUM CHLORIDE: 600; 310; 30; 20 INJECTION, SOLUTION INTRAVENOUS at 14:04

## 2022-12-21 RX ADMIN — Medication 80 MCG: at 15:15

## 2022-12-21 RX ADMIN — GLYCOPYRROLATE 0.4 MG: 0.2 INJECTION INTRAMUSCULAR; INTRAVENOUS at 15:32

## 2022-12-21 RX ADMIN — ROCURONIUM BROMIDE 50 MG: 10 SOLUTION INTRAVENOUS at 14:08

## 2022-12-21 RX ADMIN — NEOSTIGMINE METHYLSULFATE 2 MG: 1 INJECTION, SOLUTION INTRAVENOUS at 15:32

## 2022-12-21 RX ADMIN — FENTANYL CITRATE 50 MCG: 50 INJECTION, SOLUTION INTRAMUSCULAR; INTRAVENOUS at 14:55

## 2022-12-21 RX ADMIN — LIDOCAINE HYDROCHLORIDE 60 MG: 20 INJECTION, SOLUTION EPIDURAL; INFILTRATION; INTRACAUDAL; PERINEURAL at 14:08

## 2022-12-21 RX ADMIN — WATER 2 G: 1 INJECTION INTRAMUSCULAR; INTRAVENOUS; SUBCUTANEOUS at 14:11

## 2022-12-21 NOTE — ANESTHESIA POSTPROCEDURE EVALUATION
Procedure(s):  SCAR REVISION ABDOMEN AND FLANKS. general    Anesthesia Post Evaluation      Multimodal analgesia: multimodal analgesia used between 6 hours prior to anesthesia start to PACU discharge  Patient location during evaluation: PACU  Patient participation: complete - patient participated  Level of consciousness: awake  Pain management: adequate  Airway patency: patent  Anesthetic complications: no  Cardiovascular status: acceptable  Respiratory status: acceptable  Hydration status: acceptable  Comments: Seen, no complaints   Post anesthesia nausea and vomiting:  none  Final Post Anesthesia Temperature Assessment:  Normothermia (36.0-37.5 degrees C)      INITIAL Post-op Vital signs:   Vitals Value Taken Time   /82 12/21/22 1547   Temp 36.4 °C (97.6 °F) 12/21/22 1547   Pulse 81 12/21/22 1547   Resp 14 12/21/22 1547   SpO2 100 % 12/21/22 1547   Vitals shown include unvalidated device data.

## 2022-12-21 NOTE — OP NOTES
[address]  OPERATIVE REPORT    Name:  Sondra Scott  MR#:  [id number]  :  1980  ACCOUNT #:  [de-identified]  DATE OF SERVICE:  2022    PREOPERATIVE DIAGNOSES:  Personal history of previous elective body contouring surgery; postoperative atypical scars, bilateral hips; atypical scar anterior abdominal wall suprapubic mons region; postoperative pain, supraumbilical abdominal wall; desires scar revision. POSTOPERATIVE DIAGNOSES:  Personal history of previous elective body contouring surgery; postoperative atypical scars, bilateral hips; atypical scar anterior abdominal wall suprapubic mons region; postoperative pain, supraumbilical abdominal wall; desires scar revision. PROCEDURES PERFORMED:  Scar revision anterior abdominal wall including umbilicus; remove suture knot, scar revision bilateral hip or flank love handle region. SURGEON:  Rk Medel MD    ASSISTANTS:  Godfrey Elder, William Sood. STAFF:  Room #4, 7th floor, [facility] [address]:  General.    COMPLICATIONS:  None. SPECIMENS REMOVED:  None (old Prolene suture knot discarded). Additional specimens:  Scar, anterior abdominal wall, approximately 20 cm in length by 4 cm in width. Scars, bilateral posterior hips, 7 cm x 3 cm x2, discarded, not weighed. IMPLANTS:  None. ESTIMATED BLOOD LOSS:  Approximately 5 mL. IV FLUIDS:  Less than or equal to a liter. DRAINS:  None. FINDINGS:  Normal for age and history. INDICATIONS:  The patient is a pleasant 64-year-old female who had previously undergone elective body contouring surgery including liposuction of her trunk and lower extremities and an extended abdominoplasty. Her prior surgery was over a year ago and she recovered fully from her surgery.   She has, however, developed some atypical scars and fullness in the suprapubic mons, centrally in the center third of the anterior abdominoplasty scar and dog ears posterior laterally over the lateral upper thigh and buttock area. She also has point tenderness and pain in the fascial layer at the 12 o'clock position in the umbilicus. She is interested in scar revision and comes in today for these procedures. PROCEDURE:  After appropriate consent was obtained, preoperative markings were placed. She was taken to the operating room, placed on the operative table in supine position. Satisfactory general endotracheal anesthesia was obtained. SCD devices were placed per routine. She was then rotated to the prone position with appropriate padding and support. The posterior atypical scars and dog ears were injected with a local anesthesia solution, sterile prep and drape, and a #10 scalpel blade was used to directly excise the dog ears resulting in wounds posteriorly that measured approximately 7 cm in length and 3 cm in width. These were closed in two layers with 3-0 Vicryl and 3-0 Monocryl suture. Sterile dressings were placed. The drapes were removed. The skin was cleaned. She was then rotated back to a transfer stretcher in supine position back to the operative table in supine position with arms externally rotated, abducted on padded armboards. The anterior abdominal wall was also sterilely prepped and draped after a local anesthesia solution was injected in these locations as well. #10 scalpel blade was used to excise the atypical scar extending in a caudal direction approximately 4 cm towards the suprapubic mons region. This was excised with a 10-blade and removed with the electrocautery. Throughout the dissection, we encountered a number of a large calcified nodules felt to be secondary to the dissolving PDS suture knots from her original primary abdominoplasty a year ago. There was limited undermining in a cephalic direction and this wound was then closed after hemostasis was achieved with electrocautery in two layers with 2-0 Vicryl and 3-0 Monocryl suture.   An incision was made at the 12 o'clock position around the curve of the umbilicus based on her preoperative scar and with blunt and sharp dissection, we encountered a large existing Prolene suture knot from the original primary plication in the supraumbilical fascia. This was teased away from underneath the fascia and the scar and cut away. This area was reinforced with a 3-0 Prolene in a simple interrupted fashion x2. This wound was then closed in three layers with 3-0 Vicryl and 4-0 Monocryl suture. Sterile dressings of Xeroform gauze, 4x4 gauze, ABD pads, and a compression garments were placed. At the end of the procedure, sponge and needle counts were reported as correct. She was awakened, extubated, and transferred to the recovery room in satisfactory and stable condition. She will be discharged home today in care of her family with prescriptions and instructions and will follow up with me within 2 weeks.       Charlie Blount MD      [email or web address]  D:  12/21/2022 16:32  T:  12/21/2022 18:48  JOB #:  [id number]  CC:  Lonzo Prader, MD

## 2022-12-21 NOTE — ANESTHESIA PREPROCEDURE EVALUATION
Anesthetic History   No history of anesthetic complications            Review of Systems / Medical History  Patient summary reviewed, nursing notes reviewed and pertinent labs reviewed    Pulmonary  Within defined limits                 Neuro/Psych   Within defined limits           Cardiovascular                  Exercise tolerance: >4 METS     GI/Hepatic/Renal  Within defined limits              Endo/Other        Obesity     Other Findings   Comments: Encounter for cosmetic surgery           Physical Exam    Airway  Mallampati: II  TM Distance: 4 - 6 cm  Neck ROM: normal range of motion   Mouth opening: Normal     Cardiovascular    Rhythm: regular  Rate: normal         Dental  No notable dental hx       Pulmonary                 Abdominal  GI exam deferred       Other Findings            Anesthetic Plan    ASA: 2  Anesthesia type: general          Induction: Intravenous  Anesthetic plan and risks discussed with: Patient

## 2022-12-21 NOTE — INTERVAL H&P NOTE
Update History & Physical    The Patient's History and Physical of December 14, 2022 was reviewed with the patient and I examined the patient. There was no change. The surgical site was confirmed by the patient and me. Plan:  The risk, benefits, expected outcome, and alternative to the recommended procedure have been discussed with the patient. Patient understands and wants to proceed with the procedure.     Electronically signed by Debbie Fried MD on 12/21/2022 at 1:46 PM

## 2022-12-21 NOTE — DISCHARGE INSTRUCTIONS
Leave the surgical garment and dressings ON and DRY for 48 hours, then may remove for shower. Resume any important pre op medications and diet but use sport drinks like Gatorade or Power aid instead of water for 2-3 days and extra protein in diet helps wounds heal.  Keep head elevated at night when sleeping about 20-30 degrees, do not lay flat for about 2 weeks  Walk every 1-2 hours during the day in house while awake for 5 minutes during the first 2 weeks  NO strenuous activity or exercise for 4 weeks  Use stool softeners to prevent constipation  When you shower, remove the garments, discard any loose gauze, shower like you normally would, place antibiotic ointment of choice over all incisions with clean gauze, and replace the garments like you found them. You may repeat daily or every other day based on preference. Call Dr. Danilo Davalos at [phone] (cell) for questions  Anticipate a phone call from DR. Danilo Davalos tonight    Nurse Discharge Guidelines      After general anesthesia or intravenous sedation, for 24 hours or while taking prescription Narcotics:  Limit your activities  Do not drive and operate hazardous machinery  Do not make important personal or business decisions  Do  not drink alcoholic beverages  If you have not urinated within 8 hours after discharge, please contact your surgeon on call.     Report the following to your surgeon:  Excessive pain, swelling, redness or odor of or around the surgical area  Temperature over 100.5  Nausea and vomiting lasting longer than 4 hours or if unable to take medications  Any signs of decreased circulation or nerve impairment to extremity: change in color, persistent  numbness, tingling, coldness or increase pain  Any questions    ___________________________________________________________________________________________________________________________________

## 2022-12-21 NOTE — PERIOP NOTES
Reviewed discharge instructions with patient's , Polly Kaye, via phone. Polly Kaye verbalized understanding. Paper copy given to patient. No further questions at this time.

## 2022-12-21 NOTE — BRIEF OP NOTE
Brief Postoperative Note    Patient: Hieu Castro  YOB: 1980  MRN: [id number]    Date of Procedure: 12/21/2022     Pre-Op Diagnosis: COSMETIC    Post-Op Diagnosis: Same as preoperative diagnosis.       Procedure(s):  SCAR REVISION ABDOMEN AND FLANKS    Surgeon(s):  Edith Curling, MD    Surgical Assistant: Surg Asst-1: Cassy ESPINAL    Anesthesia: General     Estimated Blood Loss (mL): Minimal    Complications: None    Specimens: * No specimens in log *     Implants: * No implants in log *    Drains: * No LDAs found *    Findings: normal for age and history    Electronically Signed by Cameron Anton MD on 12/21/2022 at 3:48 PM

## (undated) DEVICE — DRAPE,REIN 53X77,STERILE: Brand: MEDLINE

## (undated) DEVICE — BLADELESS OPTICAL TROCAR WITH FIXATION CANNULA: Brand: VERSAPORT

## (undated) DEVICE — SOLUTION IRRIG 1000ML 0.9% SOD CHL USP POUR PLAS BTL

## (undated) DEVICE — TIP COVER ACCESSORY

## (undated) DEVICE — WRAP SURG W1.31XL1.34M CARD FOR PT 165-172CM THERMOWRP

## (undated) DEVICE — SPONGE GZ W4XL4IN COT 12 PLY TYP VII WVN C FLD DSGN

## (undated) DEVICE — [HIGH FLOW INSUFFLATOR,  DO NOT USE IF PACKAGE IS DAMAGED,  KEEP DRY,  KEEP AWAY FROM SUNLIGHT,  PROTECT FROM HEAT AND RADIOACTIVE SOURCES.]: Brand: PNEUMOSURE

## (undated) DEVICE — REM POLYHESIVE ADULT PATIENT RETURN ELECTRODE: Brand: VALLEYLAB

## (undated) DEVICE — SUTURE PROL SZ 0 L30IN NONABSORBABLE BLU L40MM CT 1/2 CIR 8434H

## (undated) DEVICE — SOLUTION IRRIG 1000ML STRL H2O USP PLAS POUR BTL

## (undated) DEVICE — SUTURE PROL SZ 3-0 L36IN NONABSORBABLE BLU L26MM SH 1/2 CIR 8522H

## (undated) DEVICE — SYRINGE MED 10ML TRNSLUC BRL PLUNG BLK MRK POLYPR CTRL

## (undated) DEVICE — ARM DRAPE

## (undated) DEVICE — SPONGE LAPAROTOMY W18XL18IN WHITE STRUNG RADIOPAQUE STERILE

## (undated) DEVICE — INTENDED FOR TISSUE SEPARATION, AND OTHER PROCEDURES THAT REQUIRE A SHARP SURGICAL BLADE TO PUNCTURE OR CUT.: Brand: BARD-PARKER ® CARBON RIB-BACK BLADES

## (undated) DEVICE — SUTURE VCRL SZ 3-0 L27IN ABSRB UD L24MM PS-1 3/8 CIR PRIM J936H

## (undated) DEVICE — KENDALL SCD EXPRESS SLEEVES, KNEE LENGTH, MEDIUM: Brand: KENDALL SCD

## (undated) DEVICE — MASTISOL ADHESIVE LIQ 2/3ML

## (undated) DEVICE — Z DISCONTINUED USE 2639304 STRAP POS W3INXL30FT WIDE BK TO BK HK AND LOOP CLSR ALISTRP

## (undated) DEVICE — COVER LT HNDL BLU PLAS

## (undated) DEVICE — TRAY PREP DRY W/ PREM GLV 2 APPL 6 SPNG 2 UNDPD 1 OVERWRAP

## (undated) DEVICE — DRESSING,GAUZE,XEROFORM,CURAD,5"X9",ST: Brand: CURAD

## (undated) DEVICE — SYSTEM FASCIAL CLSR UNIQUE SHLDED WNG SAFE UNIF CONSISTENT

## (undated) DEVICE — GLOVE ORANGE PI 7 1/2   MSG9075

## (undated) DEVICE — SUT SLK 2-0SH 30IN BLK --

## (undated) DEVICE — SUTURE MCRYL SZ 4-0 L18IN ABSRB UD L19MM PS-2 3/8 CIR PRIM Y496G

## (undated) DEVICE — Z INACTIVE USE 2854097 SPONGE GZ W4XL4IN COT 12 PLY TYP VII WVN C FLD DSGN

## (undated) DEVICE — COLUMN DRAPE

## (undated) DEVICE — SUTURE PDS II SZ 3-0 L27IN ABSRB CLR SH L26MM 1/2 CIR TAPR Z416H

## (undated) DEVICE — SUT VCRL 2-0 36IN CT1 UD --

## (undated) DEVICE — TRAY CATH 16F URIN MTR LTX -- CONVERT TO ITEM 363111

## (undated) DEVICE — SYR 10ML CTRL LR LCK NSAF LF --

## (undated) DEVICE — VCARE MEDIUM, UTERINE MANIPULATOR, VAGINAL-CERVICAL-AHLUWALIA'S-RETRACTOR-ELEVATOR: Brand: VCARE

## (undated) DEVICE — STERILE POLYISOPRENE POWDER-FREE SURGICAL GLOVES: Brand: PROTEXIS

## (undated) DEVICE — DRAPE,CHEST,FENES,15X10,STERIL: Brand: MEDLINE

## (undated) DEVICE — DEVON™ KNEE AND BODY STRAP 60" X 3" (1.5 M X 7.6 CM): Brand: DEVON

## (undated) DEVICE — GARMENT,MEDLINE,DVT,INT,CALF,MED, GEN2: Brand: MEDLINE

## (undated) DEVICE — MINOR BASIN -SMH: Brand: MEDLINE INDUSTRIES, INC.

## (undated) DEVICE — SKIN TEMPERATURE SENSOR: Brand: DEROYAL

## (undated) DEVICE — UNDERPAD INCON STD 36X23IN --

## (undated) DEVICE — 450 ML BOTTLE OF 0.05% CHLORHEXIDINE GLUCONATE IN 99.95% STERILE WATER FOR IRRIGATION, USP AND APPLICATOR.: Brand: IRRISEPT ANTIMICROBIAL WOUND LAVAGE

## (undated) DEVICE — PACK,ORTOHMAX/CVMAX,UNIVERSAL,5/CS: Brand: MEDLINE

## (undated) DEVICE — ROUND SHAPE BALLOON: Brand: PDB

## (undated) DEVICE — SYR 10ML LUER LOK 1/5ML GRAD --

## (undated) DEVICE — SUTURE MCRYL SZ 3-0 L27IN ABSRB UD L24MM PS-1 3/8 CIR PRIM Y936H

## (undated) DEVICE — TOWEL SURG W17XL27IN STD BLU COT NONFENESTRATED PREWASHED

## (undated) DEVICE — PAD TOPIFOAM SIL BK ADHSV FOAM 8INX12IN

## (undated) DEVICE — LIGHT HANDLE: Brand: DEVON

## (undated) DEVICE — HEX-LOCKING BLADE ELECTRODE: Brand: EDGE

## (undated) DEVICE — DISPOSABLE SUCTION/IRRIGATOR TUBE SET WITH TIP: Brand: AHTO

## (undated) DEVICE — DRAIN SURG 15FR SIL RND CHN W/ TRCR FULL FLUT DBL WRP TRAD

## (undated) DEVICE — SPONGE LAP 18X18IN STRL -- 5/PK

## (undated) DEVICE — NEEDLE EPI L3.5IN OD20GA CLR POLYCARB HUB WNG N DEHP TUOHY

## (undated) DEVICE — SUTURE ABSRB L30CM 2-0 VLT SPRL PDS + STRATAFIX SXPP1B410

## (undated) DEVICE — SMOKE EVACUATION TUBING WITH 8 IN INTEGRAL WAND AND SPONGE GUARD: Brand: BUFFALO FILTER

## (undated) DEVICE — PENCIL SMK EVAC L10FT DIA95MM TBNG NONSTICK W ADPT TO 22MM

## (undated) DEVICE — TROCAR: Brand: KII FIOS FIRST ENTRY

## (undated) DEVICE — Device

## (undated) DEVICE — DRAPE,LAPAROTOMY,T,PEDI,STERILE: Brand: MEDLINE

## (undated) DEVICE — VISUALIZATION SYSTEM: Brand: CLEARIFY

## (undated) DEVICE — SUTURE ABSRB L6IN L37MM 0 GS-21 GRN 1/2 CIR TAPR PNT NDL VLOCL0306

## (undated) DEVICE — DRAPE SURG EQUIP W105XH13XL20IN 3 ARM DISPOSABLE DA VINCI S

## (undated) DEVICE — PACK,BASIC,SIRUS,V: Brand: MEDLINE

## (undated) DEVICE — DERMABOND SKIN ADH 0.7ML -- DERMABOND ADVANCED 12/BX

## (undated) DEVICE — PREP SKN CHLRAPRP 26ML TNT -- CONVERT TO ITEM 373320

## (undated) DEVICE — (D)GLOVE SURG TRIFLX 8 PWD LTX -- DISC BY MFR USE ITEM 302994

## (undated) DEVICE — BLADE ELECTRODE: Brand: EDGE

## (undated) DEVICE — 3M™ TEGADERM™ TRANSPARENT FILM DRESSING FRAME STYLE, 1629, 8 IN X 12 IN (20 CM X 30 CM), 10/CT 8CT/CASE: Brand: 3M™ TEGADERM™

## (undated) DEVICE — DRESSING FOAM DISK DIA1IN H 7MM HYDRPHLC CHG IMPREG IN SL

## (undated) DEVICE — SOL IRR NACL 0.9% 500ML POUR --

## (undated) DEVICE — SOL INJ SOD CL 0.9% 1000ML BG --

## (undated) DEVICE — MARKER,SKIN,WI/RULER AND LABELS: Brand: MEDLINE

## (undated) DEVICE — ELECTRO LUBE IS A SINGLE PATIENT USE DEVICE THAT IS INTENDED TO BE USED ON ELECTROSURGICAL ELECTRODES TO REDUCE STICKING.: Brand: KEY SURGICAL ELECTRO LUBE

## (undated) DEVICE — 3M™ IOBAN™ 2 ANTIMICROBIAL INCISE DRAPE 6640EZ: Brand: IOBAN™ 2

## (undated) DEVICE — HANDLE LT SNAP ON ULT DURABLE LENS FOR TRUMPF ALC DISPOSABLE

## (undated) DEVICE — GOWN,AURORA,FABRIC-REINFORCED,X-LARGE: Brand: MEDLINE

## (undated) DEVICE — HYPODERMIC SAFETY NEEDLE: Brand: MONOJECT

## (undated) DEVICE — Z CONVERTED USE 2271116 TUBING ASPIR 9 FT STRL ULTRA-LIMP

## (undated) DEVICE — DRAPE,UTILITY,TAPE,15X26,STERILE: Brand: MEDLINE

## (undated) DEVICE — SUT PROL 0 30IN CT1 BLU --

## (undated) DEVICE — SUTURE PDS II SZ 2-0 L27IN ABSRB VLT L36MM CT-1 1/2 CIR Z339H

## (undated) DEVICE — SUTURE PDS II SZ 3-0 L27IN ABSRB VLT L26MM SH 1/2 CIR Z316H

## (undated) DEVICE — SEAL UNIV 5-8MM DISP BX/10 -- DA VINCI XI - SNGL USE

## (undated) DEVICE — TUBING SUCT L9FT FOR AUTOFUSE INFLTR SYS

## (undated) DEVICE — DRESSING TEGADERM MATRX 4X5 STRL

## (undated) DEVICE — TOTAL TRAY, 16FR 10ML SIL FOLEY, URN: Brand: MEDLINE

## (undated) DEVICE — BLADELESS OBTURATOR

## (undated) DEVICE — SUTURE PDS II SZ 2-0 L36IN ABSRB VLT CT L40MM 1/2 CIR TAPR Z357H

## (undated) DEVICE — SUTURE MCRYL SZ 4-0 L27IN ABSRB UD L19MM PS-2 1/2 CIR PRIM Y426H

## (undated) DEVICE — OBTRTR BLDELSS 8MM DISP -- DA VINCI - SNGL USE

## (undated) DEVICE — BASIN ST MAJOR-NO CAUTERY: Brand: MEDLINE INDUSTRIES, INC.

## (undated) DEVICE — DECANTER BAG 9": Brand: MEDLINE INDUSTRIES, INC.

## (undated) DEVICE — SHEET,DRAPE,70X100,STERILE: Brand: MEDLINE